# Patient Record
Sex: FEMALE | Race: BLACK OR AFRICAN AMERICAN | Employment: FULL TIME | URBAN - METROPOLITAN AREA
[De-identification: names, ages, dates, MRNs, and addresses within clinical notes are randomized per-mention and may not be internally consistent; named-entity substitution may affect disease eponyms.]

---

## 2017-01-16 ENCOUNTER — OFFICE VISIT (OUTPATIENT)
Dept: OBGYN CLINIC | Facility: CLINIC | Age: 47
End: 2017-01-16

## 2017-01-16 VITALS
WEIGHT: 148 LBS | BODY MASS INDEX: 29.06 KG/M2 | SYSTOLIC BLOOD PRESSURE: 122 MMHG | HEIGHT: 60 IN | DIASTOLIC BLOOD PRESSURE: 74 MMHG

## 2017-01-16 DIAGNOSIS — Z12.31 VISIT FOR SCREENING MAMMOGRAM: Primary | ICD-10-CM

## 2017-01-16 DIAGNOSIS — Z01.419 WELL WOMAN EXAM: ICD-10-CM

## 2017-01-16 PROCEDURE — 99396 PREV VISIT EST AGE 40-64: CPT | Performed by: OBSTETRICS & GYNECOLOGY

## 2017-01-18 NOTE — PROGRESS NOTES
HPI:   Laeh Barrett is a 51year old female who presents for an annual/pap.         Wt Readings from Last 6 Encounters:  01/16/17 : 148 lb (67.132 kg)  02/15/16 : 142 lb (64.411 kg)  01/22/16 : 142 lb 3.2 oz (64.501 kg)  06/01/15 : 148 lb (67.132 kg)  02 Alcohol Use: Yes           0.0 oz/week       0 Standard drinks or equivalent per week       Comment: Socially, 1 drink a year.            REVIEW OF SYSTEMS:   GENERAL: feels well otherwise  SKIN: denies any unusual skin lesions  EYES:denies

## 2017-01-19 LAB — HPV I/H RISK 1 DNA SPEC QL NAA+PROBE: NEGATIVE

## 2017-02-06 NOTE — PROGRESS NOTES
Quick Note:    Normal letter sent via [address] [place] [address] [address].  FRIEDA ESCOBAR.  ______

## 2017-02-07 RX ORDER — LOVASTATIN 20 MG/1
TABLET ORAL
Qty: 90 TABLET | Refills: 0 | Status: SHIPPED | OUTPATIENT
Start: 2017-02-07 | End: 2017-05-17

## 2017-02-12 ENCOUNTER — HOSPITAL ENCOUNTER (OUTPATIENT)
Dept: MAMMOGRAPHY | Facility: HOSPITAL | Age: 47
Discharge: HOME | End: 2017-02-12
Attending: OBSTETRICS & GYNECOLOGY
Payer: COMMERCIAL

## 2017-02-12 DIAGNOSIS — Z01.419 WELL WOMAN EXAM: ICD-10-CM

## 2017-02-12 PROCEDURE — 77067 SCR MAMMO BI INCL CAD: CPT

## 2017-02-17 ENCOUNTER — OFFICE VISIT (OUTPATIENT)
Dept: INTERNAL MEDICINE CLINIC | Facility: CLINIC | Age: 47
End: 2017-02-17

## 2017-02-17 VITALS
DIASTOLIC BLOOD PRESSURE: 72 MMHG | OXYGEN SATURATION: 100 % | HEART RATE: 73 BPM | WEIGHT: 144 LBS | TEMPERATURE: 98 F | HEIGHT: 60 IN | BODY MASS INDEX: 28.27 KG/M2 | SYSTOLIC BLOOD PRESSURE: 110 MMHG

## 2017-02-17 DIAGNOSIS — Z00.00 ROUTINE GENERAL MEDICAL EXAMINATION AT A HEALTH CARE FACILITY: Primary | ICD-10-CM

## 2017-02-17 LAB
ALBUMIN SERPL BCP-MCNC: 3.7 G/DL (ref 3.5–4.8)
ALBUMIN/GLOB SERPL: 1.1 {RATIO} (ref 1–2)
ALP SERPL-CCNC: 51 U/L (ref 32–100)
ALT SERPL-CCNC: 12 U/L (ref 14–54)
ANION GAP SERPL CALC-SCNC: 8 MMOL/L (ref 0–18)
AST SERPL-CCNC: 19 U/L (ref 15–41)
BILIRUB SERPL-MCNC: 0.7 MG/DL (ref 0.3–1.2)
BUN SERPL-MCNC: 9 MG/DL (ref 8–20)
BUN/CREAT SERPL: 9.6 (ref 10–20)
CALCIUM SERPL-MCNC: 9.2 MG/DL (ref 8.5–10.5)
CHLORIDE SERPL-SCNC: 104 MMOL/L (ref 95–110)
CHOLEST SERPL-MCNC: 151 MG/DL (ref 110–200)
CO2 SERPL-SCNC: 26 MMOL/L (ref 22–32)
CREAT SERPL-MCNC: 0.94 MG/DL (ref 0.5–1.5)
GLOBULIN PLAS-MCNC: 3.3 G/DL (ref 2.5–3.7)
GLUCOSE SERPL-MCNC: 78 MG/DL (ref 70–99)
HDLC SERPL-MCNC: 54 MG/DL
LDLC SERPL CALC-MCNC: 83 MG/DL (ref 0–99)
NONHDLC SERPL-MCNC: 97 MG/DL
OSMOLALITY UR CALC.SUM OF ELEC: 284 MOSM/KG (ref 275–295)
POTASSIUM SERPL-SCNC: 3.6 MMOL/L (ref 3.3–5.1)
PROT SERPL-MCNC: 7 G/DL (ref 5.9–8.4)
SODIUM SERPL-SCNC: 138 MMOL/L (ref 136–144)
TRIGL SERPL-MCNC: 69 MG/DL (ref 1–149)

## 2017-02-17 PROCEDURE — 99396 PREV VISIT EST AGE 40-64: CPT | Performed by: INTERNAL MEDICINE

## 2017-02-17 PROCEDURE — 36415 COLL VENOUS BLD VENIPUNCTURE: CPT | Performed by: INTERNAL MEDICINE

## 2017-02-17 NOTE — PROGRESS NOTES
HPI:   Renetta Sandra is a 51year old female who presents for a complete physical exam. Symptoms: periods are regular. Patient complains of needs annual exam. Has occ clots.   Due for recheck of her cholesterol, taking the lovastatin regularly     Wt Read History:     Smoking Status: Never Smoker                      Alcohol Use: Yes           0.0 oz/week       0 Standard drinks or equivalent per week       Comment: Socially, 1 drink a year.     Exercise: minimal.  Diet: trying to eat healthier     REVIEW OF Normal. Oropharynx - clear no erythema or exudate   Neck Exam Normal Palpation - Normal. No thyromegaly or lymphadenopathy   Breast Normal Done with gyne   Respiratory Normal Auscultation - Normal, no wheezes or rales   Cardiovascular Normal Regular rate a

## 2017-02-17 NOTE — PATIENT INSTRUCTIONS
ASSESSMENT AND PLAN:   Kelsey Clemons is a 51year old female who presents for a complete physical exam.  Self breast exam explained. Health maintenance: Patient is due for check of her cholesterol.    Pt' s weight is Body mass index is 28.12 kg/(m^2) Try to hold each Kegel for a slow count to 5. You probably won’t be able to hold them for that long at first. But keep practicing. It will get easier as your pelvic floor gets stronger.  Eventually, special weights that you place in your vagina may be recom

## 2017-02-27 ENCOUNTER — TELEPHONE (OUTPATIENT)
Dept: OBGYN CLINIC | Facility: CLINIC | Age: 47
End: 2017-02-27

## 2017-02-27 NOTE — TELEPHONE ENCOUNTER
Per pt would like to know how was the visit code it on 01/16/2017 ? Because she needs to be re code it, getting a bill for annual pap. ..  Was told was code incorrectly

## 2017-02-27 NOTE — TELEPHONE ENCOUNTER
YOKASTA- was told to spk w Dr. Debroah Mohs office-okay to [facility] once this is done or what happened?  [phone]

## 2017-03-01 NOTE — TELEPHONE ENCOUNTER
Pap was billed with diagnosis for annual mammo,  And mammo was associated with pap diagnosis, need to call to get corrected, lm informing pt we will look into how to fix this.

## 2017-05-17 RX ORDER — LOVASTATIN 20 MG/1
TABLET ORAL
Qty: 90 TABLET | Refills: 0 | Status: SHIPPED | OUTPATIENT
Start: 2017-05-17 | End: 2017-09-20

## 2017-05-17 NOTE — TELEPHONE ENCOUNTER
Refill protocol criteria met, rx sent.       Cholesterol Medications  Protocol Criteria:  · Appointment scheduled in the past 12 months or in the next 3 months  · ALT & LDL on file in the past 12 months  · ALT result < 80  · LDL result <130   Recent Visits

## 2017-09-20 RX ORDER — LOVASTATIN 20 MG/1
TABLET ORAL
Qty: 90 TABLET | Refills: 0 | Status: SHIPPED | OUTPATIENT
Start: 2017-09-20 | End: 2017-11-28

## 2017-11-28 ENCOUNTER — TELEPHONE (OUTPATIENT)
Dept: INTERNAL MEDICINE CLINIC | Facility: CLINIC | Age: 47
End: 2017-11-28

## 2017-11-28 RX ORDER — LOVASTATIN 20 MG/1
TABLET ORAL
Qty: 90 TABLET | Refills: 0 | Status: SHIPPED | OUTPATIENT
Start: 2017-11-28 | End: 2018-02-25

## 2017-11-28 RX ORDER — FLUTICASONE PROPIONATE 50 MCG
SPRAY, SUSPENSION (ML) NASAL
Qty: 1 BOTTLE | Refills: 3 | Status: SHIPPED | OUTPATIENT
Start: 2017-11-28 | End: 2018-09-07

## 2017-11-28 NOTE — TELEPHONE ENCOUNTER
Current Outpatient Prescriptions:     •  LOVASTATIN 20 MG Oral Tab, TAKE 1 TABLET (20MG) BY MOUTH EVERY DAY WITH THE EVENING MEAL, Disp: 90 tablet, Rfl: 0    •  FLUTICASONE PROPIONATE 50 MCG/ACT Nasal Suspension, USE 1 SPRAY BY INTRANASAL ROUTE 2 TIMES E

## 2017-11-28 NOTE — TELEPHONE ENCOUNTER
Patient has not been seen since 2/2017. However, this seems to be normal that she follows up once a year. Do you want an appt, or can we refill medications?

## 2018-02-20 ENCOUNTER — OFFICE VISIT (OUTPATIENT)
Dept: OBGYN CLINIC | Facility: CLINIC | Age: 48
End: 2018-02-20

## 2018-02-20 VITALS
WEIGHT: 152 LBS | HEIGHT: 60 IN | SYSTOLIC BLOOD PRESSURE: 110 MMHG | DIASTOLIC BLOOD PRESSURE: 68 MMHG | BODY MASS INDEX: 29.84 KG/M2

## 2018-02-20 DIAGNOSIS — Z12.31 VISIT FOR SCREENING MAMMOGRAM: Primary | ICD-10-CM

## 2018-02-20 DIAGNOSIS — Z01.419 ENCOUNTER FOR GYNECOLOGICAL EXAMINATION WITHOUT ABNORMAL FINDING: ICD-10-CM

## 2018-02-20 PROCEDURE — 99396 PREV VISIT EST AGE 40-64: CPT | Performed by: OBSTETRICS & GYNECOLOGY

## 2018-02-20 NOTE — PROGRESS NOTES
HPI:   Karly Liu is a 49year old female who presents for an annual/pap.        Wt Readings from Last 6 Encounters:  02/20/18 : 152 lb (68.9 kg)  02/17/17 : 144 lb (65.3 kg)  01/16/17 : 148 lb (67.1 kg)  02/15/16 : 142 lb (64.4 kg)  01/22/16 : 142 lb Psychiatric Father      alcoholic   • Heart Disease Maternal Grandmother 80     Cause of death   • Psychiatric Cousin      bipolar      Social History:   Smoking status: Never Smoker                                                              Smokeless to exercise 30 minutes three times weekly. The patient indicates understanding of these issues and agrees to the plan. The patient is asked to return for CPX in 1 year.

## 2018-02-21 LAB — HPV I/H RISK 1 DNA SPEC QL NAA+PROBE: NEGATIVE

## 2018-02-22 LAB — LAST PAP RESULT: NORMAL

## 2018-02-25 RX ORDER — LOVASTATIN 20 MG/1
TABLET ORAL
Qty: 90 TABLET | Refills: 1 | Status: SHIPPED | OUTPATIENT
Start: 2018-02-25 | End: 2018-09-07

## 2018-02-25 NOTE — PROGRESS NOTES
HPI:   Karly Liu is a 49year old female who presents for a complete physical exam. Symptoms: periods are regular.  Patient complains of needs physical exam  Had pap with Dr Jessica Martin and normal, given order for mammogram    .     Wt Readings from Last Onset   • Cancer Father      Throat. Cause of death.     • Psychiatric Father      alcoholic   • Heart Disease Maternal Grandmother 80     Cause of death   • Psychiatric Cousin      bipolar      Social History:   Smoking status: Never Smoker Constitutional Normal Well developed.    Eyes Normal Pupil - Right: Normal, Left: Normal.   Ears Normal External - normal. Canal. TM - Normal bilaterally  Hearing - grossly normal   Nasopharynx Normal External nose - Normal. Lips/teeth/gums - Normal. Or

## 2018-02-26 ENCOUNTER — OFFICE VISIT (OUTPATIENT)
Dept: INTERNAL MEDICINE CLINIC | Facility: CLINIC | Age: 48
End: 2018-02-26

## 2018-02-26 VITALS
TEMPERATURE: 99 F | HEIGHT: 60 IN | DIASTOLIC BLOOD PRESSURE: 87 MMHG | SYSTOLIC BLOOD PRESSURE: 137 MMHG | HEART RATE: 58 BPM | BODY MASS INDEX: 30.82 KG/M2 | WEIGHT: 157 LBS

## 2018-02-26 DIAGNOSIS — E78.00 HYPERCHOLESTEROLEMIA: ICD-10-CM

## 2018-02-26 DIAGNOSIS — K62.5 RECTAL BLEEDING: ICD-10-CM

## 2018-02-26 DIAGNOSIS — Z00.00 ROUTINE GENERAL MEDICAL EXAMINATION AT A HEALTH CARE FACILITY: Primary | ICD-10-CM

## 2018-02-26 PROCEDURE — 99396 PREV VISIT EST AGE 40-64: CPT | Performed by: INTERNAL MEDICINE

## 2018-02-27 NOTE — PATIENT INSTRUCTIONS
ASSESSMENT AND PLAN:   Sharon Flynn is a 49year old female who presents for a complete physical exam. Self breast exam explained.    Health maintenance: patient is due for flu shot, cholesterol has mamm order from [facility]  Pt' s weight is Body mass i

## 2018-03-11 ENCOUNTER — LAB ENCOUNTER (OUTPATIENT)
Dept: LAB | Facility: HOSPITAL | Age: 48
End: 2018-03-11
Attending: OBSTETRICS & GYNECOLOGY
Payer: COMMERCIAL

## 2018-03-11 ENCOUNTER — HOSPITAL ENCOUNTER (OUTPATIENT)
Dept: MAMMOGRAPHY | Facility: HOSPITAL | Age: 48
Discharge: HOME | End: 2018-03-11
Attending: OBSTETRICS & GYNECOLOGY
Payer: COMMERCIAL

## 2018-03-11 DIAGNOSIS — E78.00 HYPERCHOLESTEROLEMIA: Primary | ICD-10-CM

## 2018-03-11 DIAGNOSIS — Z12.31 VISIT FOR SCREENING MAMMOGRAM: ICD-10-CM

## 2018-03-11 LAB
ALBUMIN SERPL BCP-MCNC: 3.8 G/DL (ref 3.5–4.8)
ALBUMIN/GLOB SERPL: 1.1 {RATIO} (ref 1–2)
ALP SERPL-CCNC: 53 U/L (ref 32–100)
ALT SERPL-CCNC: 17 U/L (ref 14–54)
ANION GAP SERPL CALC-SCNC: 8 MMOL/L (ref 0–18)
AST SERPL-CCNC: 24 U/L (ref 15–41)
BILIRUB SERPL-MCNC: 0.5 MG/DL (ref 0.3–1.2)
BUN SERPL-MCNC: 7 MG/DL (ref 8–20)
BUN/CREAT SERPL: 7.4 (ref 10–20)
CALCIUM SERPL-MCNC: 9.3 MG/DL (ref 8.5–10.5)
CHLORIDE SERPL-SCNC: 102 MMOL/L (ref 95–110)
CHOLEST SERPL-MCNC: 201 MG/DL (ref 110–200)
CO2 SERPL-SCNC: 29 MMOL/L (ref 22–32)
CREAT SERPL-MCNC: 0.95 MG/DL (ref 0.5–1.5)
GLOBULIN PLAS-MCNC: 3.5 G/DL (ref 2.5–3.7)
GLUCOSE SERPL-MCNC: 92 MG/DL (ref 70–99)
HDLC SERPL-MCNC: 60 MG/DL
LDLC SERPL CALC-MCNC: 113 MG/DL (ref 0–99)
NONHDLC SERPL-MCNC: 141 MG/DL
OSMOLALITY UR CALC.SUM OF ELEC: 286 MOSM/KG (ref 275–295)
PATIENT FASTING: YES
POTASSIUM SERPL-SCNC: 4.5 MMOL/L (ref 3.3–5.1)
PROT SERPL-MCNC: 7.3 G/DL (ref 5.9–8.4)
SODIUM SERPL-SCNC: 139 MMOL/L (ref 136–144)
TRIGL SERPL-MCNC: 140 MG/DL (ref 1–149)

## 2018-03-11 PROCEDURE — 77067 SCR MAMMO BI INCL CAD: CPT | Performed by: OBSTETRICS & GYNECOLOGY

## 2018-03-11 PROCEDURE — 80053 COMPREHEN METABOLIC PANEL: CPT

## 2018-03-11 PROCEDURE — 36415 COLL VENOUS BLD VENIPUNCTURE: CPT

## 2018-03-11 PROCEDURE — 80061 LIPID PANEL: CPT

## 2018-05-16 ENCOUNTER — OFFICE VISIT (OUTPATIENT)
Dept: INTERNAL MEDICINE CLINIC | Facility: CLINIC | Age: 48
End: 2018-05-16

## 2018-05-16 VITALS
BODY MASS INDEX: 29.83 KG/M2 | WEIGHT: 158 LBS | TEMPERATURE: 98 F | RESPIRATION RATE: 18 BRPM | HEART RATE: 78 BPM | HEIGHT: 61 IN | DIASTOLIC BLOOD PRESSURE: 88 MMHG | SYSTOLIC BLOOD PRESSURE: 136 MMHG

## 2018-05-16 DIAGNOSIS — J02.9 SORE THROAT: Primary | ICD-10-CM

## 2018-05-16 DIAGNOSIS — B34.9 VIRAL INFECTION: ICD-10-CM

## 2018-05-16 PROCEDURE — 99212 OFFICE O/P EST SF 10 MIN: CPT | Performed by: INTERNAL MEDICINE

## 2018-05-16 PROCEDURE — 99213 OFFICE O/P EST LOW 20 MIN: CPT | Performed by: INTERNAL MEDICINE

## 2018-05-16 NOTE — PROGRESS NOTES
HPI:    Patient ID: Sharon Flynn is a 51year old female. HPI she came in today for follow-up on her viral infection.   According to her she went to see her dentist and  They told her that most likely she has viral infection , according to her she had Current Outpatient Prescriptions:  Multiple Vitamins-Minerals (ALIVE ONCE DAILY WOMENS 50+ OR)  Disp:  Rfl:    Fluticasone Propionate 50 MCG/ACT Nasal Suspension USE 1 SPRAY BY INTRANASAL ROUTE 2 TIMES EVERY DAY IN EACH NOSTRIL Disp: 1 Bottle Rf tenderness. Left sinus exhibits no maxillary sinus tenderness and no frontal sinus tenderness. Mouth/Throat: Uvula is midline, oropharynx is clear and moist and mucous membranes are normal. Mucous membranes are not cyanotic.  No oropharyngeal exudate, pos if not feeling better call  ? Mouth infection- exam benign - advised her for good oral hygiene  use listerine mouth wash ,, check HSV1/2      Orders Placed This Encounter      POC Rapid Strep [[id number]]      HSV Types 1 + 2 Ab, IgG and IgM with Reflex to Type

## 2018-05-16 NOTE — PATIENT INSTRUCTIONS
Sore throat  (primary encounter diagnosis) her rapid strep is negative , advised to drink plenty of fluids plenty of fluids salt gargles can help as well if not feeling better call  ? Mouth infection- exam benign - advised her for good oral hygiene  use lis

## 2018-05-21 ENCOUNTER — TELEPHONE (OUTPATIENT)
Dept: INTERNAL MEDICINE CLINIC | Facility: CLINIC | Age: 48
End: 2018-05-21

## 2018-05-22 NOTE — TELEPHONE ENCOUNTER
Dr. Joann Salas pt is requesting to speak with you regarding her test results HSV type 1. Please call pt.

## 2018-05-23 NOTE — TELEPHONE ENCOUNTER
Attempted to reach patient, l/m on machine to call back. 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

## 2018-09-07 ENCOUNTER — OFFICE VISIT (OUTPATIENT)
Dept: INTERNAL MEDICINE CLINIC | Facility: CLINIC | Age: 48
End: 2018-09-07

## 2018-09-07 VITALS
WEIGHT: 161 LBS | SYSTOLIC BLOOD PRESSURE: 126 MMHG | DIASTOLIC BLOOD PRESSURE: 72 MMHG | OXYGEN SATURATION: 98 % | BODY MASS INDEX: 30 KG/M2 | TEMPERATURE: 98 F | HEART RATE: 63 BPM

## 2018-09-07 DIAGNOSIS — E78.00 HYPERCHOLESTEROLEMIA: Primary | ICD-10-CM

## 2018-09-07 DIAGNOSIS — J30.1 ALLERGIC RHINITIS DUE TO POLLEN, UNSPECIFIED SEASONALITY: ICD-10-CM

## 2018-09-07 LAB
ALBUMIN SERPL BCP-MCNC: 3.6 G/DL (ref 3.5–4.8)
ALBUMIN/GLOB SERPL: 1.1 {RATIO} (ref 1–2)
ALP SERPL-CCNC: 54 U/L (ref 32–100)
ALT SERPL-CCNC: 12 U/L (ref 14–54)
ANION GAP SERPL CALC-SCNC: 6 MMOL/L (ref 0–18)
AST SERPL-CCNC: 21 U/L (ref 15–41)
BILIRUB SERPL-MCNC: 0.3 MG/DL (ref 0.3–1.2)
BUN SERPL-MCNC: 7 MG/DL (ref 8–20)
BUN/CREAT SERPL: 7 (ref 10–20)
CALCIUM SERPL-MCNC: 8.9 MG/DL (ref 8.5–10.5)
CHLORIDE SERPL-SCNC: 108 MMOL/L (ref 95–110)
CHOLEST SERPL-MCNC: 164 MG/DL (ref 110–200)
CO2 SERPL-SCNC: 26 MMOL/L (ref 22–32)
CREAT SERPL-MCNC: 1 MG/DL (ref 0.5–1.5)
GLOBULIN PLAS-MCNC: 3.3 G/DL (ref 2.5–3.7)
GLUCOSE SERPL-MCNC: 78 MG/DL (ref 70–99)
HDLC SERPL-MCNC: 52 MG/DL
LDLC SERPL CALC-MCNC: 86 MG/DL (ref 0–99)
NONHDLC SERPL-MCNC: 112 MG/DL
OSMOLALITY UR CALC.SUM OF ELEC: 287 MOSM/KG (ref 275–295)
PATIENT FASTING: YES
POTASSIUM SERPL-SCNC: 3.7 MMOL/L (ref 3.3–5.1)
PROT SERPL-MCNC: 6.9 G/DL (ref 5.9–8.4)
SODIUM SERPL-SCNC: 140 MMOL/L (ref 136–144)
TRIGL SERPL-MCNC: 128 MG/DL (ref 1–149)

## 2018-09-07 PROCEDURE — 36415 COLL VENOUS BLD VENIPUNCTURE: CPT | Performed by: INTERNAL MEDICINE

## 2018-09-07 PROCEDURE — 99213 OFFICE O/P EST LOW 20 MIN: CPT | Performed by: INTERNAL MEDICINE

## 2018-09-07 RX ORDER — LOVASTATIN 20 MG/1
TABLET ORAL
Qty: 90 TABLET | Refills: 3 | Status: SHIPPED | OUTPATIENT
Start: 2018-09-07 | End: 2019-10-24

## 2018-09-07 RX ORDER — FLUTICASONE PROPIONATE 50 MCG
SPRAY, SUSPENSION (ML) NASAL
Qty: 1 BOTTLE | Refills: 6 | Status: SHIPPED | OUTPATIENT
Start: 2018-09-07 | End: 2019-09-24

## 2018-09-07 NOTE — PATIENT INSTRUCTIONS
ASSESSMENT/PLAN:   Hypercholesterolemia  Cholesterol well controlled, continue medication, due for recheck     Allergic rhinitis  Continue her zyrtec D and flonase

## 2018-09-07 NOTE — PROGRESS NOTES
HPI:    Patient ID: Kelsey Clemons is a 51year old female. HPI  Hyperlipidemia  Patient here to follow up for their elevated cholesterol. Patient has been following low fat diet. Current medication lovastatin, is compliant with taking.    Having no ac atraumatic. Nose: Right sinus exhibits maxillary sinus tenderness. Left sinus exhibits maxillary sinus tenderness. Mouth/Throat: Oropharynx is clear and moist. No posterior oropharyngeal erythema. Cardiovascular: Normal rate and regular rhythm.      E

## 2019-02-23 ENCOUNTER — OFFICE VISIT (OUTPATIENT)
Dept: OBGYN CLINIC | Facility: CLINIC | Age: 49
End: 2019-02-23

## 2019-02-23 VITALS
HEIGHT: 60 IN | DIASTOLIC BLOOD PRESSURE: 80 MMHG | SYSTOLIC BLOOD PRESSURE: 110 MMHG | BODY MASS INDEX: 30.7 KG/M2 | WEIGHT: 156.38 LBS

## 2019-02-23 DIAGNOSIS — Z01.419 ENCOUNTER FOR WELL WOMAN EXAM WITH ROUTINE GYNECOLOGICAL EXAM: Primary | ICD-10-CM

## 2019-02-23 DIAGNOSIS — Z12.31 ENCOUNTER FOR SCREENING MAMMOGRAM FOR BREAST CANCER: ICD-10-CM

## 2019-02-23 PROCEDURE — 99396 PREV VISIT EST AGE 40-64: CPT | Performed by: OBSTETRICS & GYNECOLOGY

## 2019-02-23 NOTE — PROGRESS NOTES
HPI:   Betzy Tomlinson is a 51year old female who presents for an annual/pap.        Wt Readings from Last 6 Encounters:  02/23/19 : 156 lb 6.4 oz (70.9 kg)  09/07/18 : 161 lb (73 kg)  05/16/18 : 158 lb (71.7 kg)  02/26/18 : 157 lb (71.2 kg)  02/20/18 : 14 Age of Onset   • Cancer Father         Throat. Cause of death.     • Psychiatric Father         alcoholic   • Heart Disease Maternal Grandmother 80        Cause of death   • Psychiatric Cousin         bipolar      Social History:   Social History    Tobacco mass index is 30.54 kg/m². , recommended low fat diet and aerobic exercise 30 minutes three times weekly. The patient indicates understanding of these issues and agrees to the plan. The patient is asked to return for an annual/pap.

## 2019-02-25 LAB — HPV I/H RISK 1 DNA SPEC QL NAA+PROBE: NEGATIVE

## 2019-03-24 ENCOUNTER — HOSPITAL ENCOUNTER (OUTPATIENT)
Dept: MAMMOGRAPHY | Facility: HOSPITAL | Age: 49
Discharge: HOME | End: 2019-03-24
Attending: OBSTETRICS & GYNECOLOGY
Payer: COMMERCIAL

## 2019-03-24 DIAGNOSIS — Z12.31 ENCOUNTER FOR SCREENING MAMMOGRAM FOR BREAST CANCER: ICD-10-CM

## 2019-03-24 PROCEDURE — 77063 BREAST TOMOSYNTHESIS BI: CPT | Performed by: OBSTETRICS & GYNECOLOGY

## 2019-03-24 PROCEDURE — 77067 SCR MAMMO BI INCL CAD: CPT | Performed by: OBSTETRICS & GYNECOLOGY

## 2019-08-29 ENCOUNTER — NURSE TRIAGE (OUTPATIENT)
Dept: OTHER | Age: 49
End: 2019-08-29

## 2019-08-29 NOTE — TELEPHONE ENCOUNTER
Pt requesting appt for next week d/t work schedule, c/c head ache on/off but more on -believes it a migraine- denies n/v,sensitive to light -stated she have a lot of stress-appt made for next Tuesday advised ER if s/s worsens    Reason for Disposition  • H

## 2019-09-02 ENCOUNTER — HOSPITAL ENCOUNTER (EMERGENCY)
Facility: HOSPITAL | Age: 49
Discharge: HOME | End: 2019-09-02
Attending: EMERGENCY MEDICINE
Payer: COMMERCIAL

## 2019-09-02 VITALS
WEIGHT: 150 LBS | BODY MASS INDEX: 29.45 KG/M2 | SYSTOLIC BLOOD PRESSURE: 146 MMHG | HEIGHT: 60 IN | DIASTOLIC BLOOD PRESSURE: 93 MMHG | RESPIRATION RATE: 15 BRPM | TEMPERATURE: 98 F | HEART RATE: 54 BPM | OXYGEN SATURATION: 99 %

## 2019-09-02 DIAGNOSIS — G43.809 OTHER MIGRAINE WITHOUT STATUS MIGRAINOSUS, NOT INTRACTABLE: Primary | ICD-10-CM

## 2019-09-02 PROCEDURE — 96372 THER/PROPH/DIAG INJ SC/IM: CPT

## 2019-09-02 PROCEDURE — 99284 EMERGENCY DEPT VISIT MOD MDM: CPT

## 2019-09-02 RX ORDER — DEXAMETHASONE SODIUM PHOSPHATE 10 MG/ML
10 INJECTION, SOLUTION INTRAMUSCULAR; INTRAVENOUS ONCE
Status: COMPLETED | OUTPATIENT
Start: 2019-09-02 | End: 2019-09-02

## 2019-09-02 RX ORDER — KETOROLAC TROMETHAMINE 30 MG/ML
30 INJECTION, SOLUTION INTRAMUSCULAR; INTRAVENOUS ONCE
Status: COMPLETED | OUTPATIENT
Start: 2019-09-02 | End: 2019-09-02

## 2019-09-02 NOTE — ED NOTES
Presents to ER for c/o L sided headache x10 days, varying in intensity. States pain travels from behind her L eye to behind her L ear and to the back of her neck. Intermittent nausea. No c/o photosensitivity or fever.  Denies vision changes but does report

## 2019-09-02 NOTE — ED PROVIDER NOTES
Patient Seen in: [facility] Emergency Department    History   Patient presents with:  Headache    Stated Complaint: migraines/ no fevers    HPI  Pt c/o a 9/10 headache that began 1 week ago. Headache located on the left side of her head.   This is reviewed from today and agreed except as otherwise stated in HPI.     Physical Exam     ED Triage Vitals [09/02/19 0924]   /89   Pulse 83   Resp 18   Temp 98.2 °F (36.8 °C)   Temp src Oral   SpO2 99 %   O2 Device None (Room air)       Current:/8 MD Paula 70  [phone]    In 1 day        Medications Prescribed:  Current Discharge Medication List

## 2019-09-03 ENCOUNTER — OFFICE VISIT (OUTPATIENT)
Dept: INTERNAL MEDICINE CLINIC | Facility: CLINIC | Age: 49
End: 2019-09-03

## 2019-09-03 ENCOUNTER — LAB ENCOUNTER (OUTPATIENT)
Dept: LAB | Facility: HOSPITAL | Age: 49
End: 2019-09-03
Attending: INTERNAL MEDICINE
Payer: COMMERCIAL

## 2019-09-03 VITALS
BODY MASS INDEX: 30.82 KG/M2 | SYSTOLIC BLOOD PRESSURE: 111 MMHG | WEIGHT: 157 LBS | DIASTOLIC BLOOD PRESSURE: 72 MMHG | HEIGHT: 60 IN | HEART RATE: 66 BPM | OXYGEN SATURATION: 98 %

## 2019-09-03 DIAGNOSIS — E78.00 HYPERCHOLESTEROLEMIA: ICD-10-CM

## 2019-09-03 DIAGNOSIS — Z00.00 ADULT GENERAL MEDICAL EXAM: ICD-10-CM

## 2019-09-03 DIAGNOSIS — G43.119 INTRACTABLE MIGRAINE WITH AURA WITHOUT STATUS MIGRAINOSUS: Primary | ICD-10-CM

## 2019-09-03 DIAGNOSIS — Z12.11 SCREEN FOR COLON CANCER: ICD-10-CM

## 2019-09-03 LAB
ALBUMIN SERPL-MCNC: 4 G/DL (ref 3.4–5)
ALBUMIN/GLOB SERPL: 0.9 {RATIO} (ref 1–2)
ALP LIVER SERPL-CCNC: 70 U/L (ref 39–100)
ALT SERPL-CCNC: 16 U/L (ref 13–56)
ANION GAP SERPL CALC-SCNC: 5 MMOL/L (ref 0–18)
AST SERPL-CCNC: 18 U/L (ref 15–37)
BILIRUB SERPL-MCNC: 0.4 MG/DL (ref 0.1–2)
BUN BLD-MCNC: 12 MG/DL (ref 7–18)
BUN/CREAT SERPL: 8.8 (ref 10–20)
CALCIUM BLD-MCNC: 9.5 MG/DL (ref 8.5–10.1)
CHLORIDE SERPL-SCNC: 107 MMOL/L (ref 98–112)
CHOLEST SMN-MCNC: 179 MG/DL (ref ?–200)
CO2 SERPL-SCNC: 29 MMOL/L (ref 21–32)
CREAT BLD-MCNC: 1.37 MG/DL (ref 0.55–1.02)
DEPRECATED RDW RBC AUTO: 42.1 FL (ref 35.1–46.3)
ERYTHROCYTE [DISTWIDTH] IN BLOOD BY AUTOMATED COUNT: 12.9 % (ref 11–15)
GLOBULIN PLAS-MCNC: 4.4 G/DL (ref 2.8–4.4)
GLUCOSE BLD-MCNC: 90 MG/DL (ref 70–99)
HCT VFR BLD AUTO: 40.9 % (ref 35–48)
HDLC SERPL-MCNC: 79 MG/DL (ref 40–59)
HGB BLD-MCNC: 13.3 G/DL (ref 12–16)
LDLC SERPL CALC-MCNC: 85 MG/DL (ref ?–100)
M PROTEIN MFR SERPL ELPH: 8.4 G/DL (ref 6.4–8.2)
MCH RBC QN AUTO: 29.3 PG (ref 26–34)
MCHC RBC AUTO-ENTMCNC: 32.5 G/DL (ref 31–37)
MCV RBC AUTO: 90.1 FL (ref 80–100)
NONHDLC SERPL-MCNC: 100 MG/DL (ref ?–130)
OSMOLALITY SERPL CALC.SUM OF ELEC: 291 MOSM/KG (ref 275–295)
PATIENT FASTING: YES
PATIENT FASTING: YES
PLATELET # BLD AUTO: 298 10(3)UL (ref 150–450)
POTASSIUM SERPL-SCNC: 3.9 MMOL/L (ref 3.5–5.1)
RBC # BLD AUTO: 4.54 X10(6)UL (ref 3.8–5.3)
SODIUM SERPL-SCNC: 141 MMOL/L (ref 136–145)
T3FREE SERPL-MCNC: 1.86 PG/ML (ref 2.4–4.2)
T4 FREE SERPL-MCNC: 1 NG/DL (ref 0.8–1.7)
TRIGL SERPL-MCNC: 76 MG/DL (ref 30–149)
TSI SER-ACNC: 0.24 MIU/ML (ref 0.36–3.74)
VLDLC SERPL CALC-MCNC: 15 MG/DL (ref 0–30)
WBC # BLD AUTO: 18.5 X10(3) UL (ref 4–11)

## 2019-09-03 PROCEDURE — 99213 OFFICE O/P EST LOW 20 MIN: CPT | Performed by: INTERNAL MEDICINE

## 2019-09-03 PROCEDURE — 84481 FREE ASSAY (FT-3): CPT

## 2019-09-03 PROCEDURE — 80061 LIPID PANEL: CPT

## 2019-09-03 PROCEDURE — 84439 ASSAY OF FREE THYROXINE: CPT

## 2019-09-03 PROCEDURE — 85027 COMPLETE CBC AUTOMATED: CPT

## 2019-09-03 PROCEDURE — 36415 COLL VENOUS BLD VENIPUNCTURE: CPT

## 2019-09-03 PROCEDURE — 80053 COMPREHEN METABOLIC PANEL: CPT

## 2019-09-03 PROCEDURE — 84443 ASSAY THYROID STIM HORMONE: CPT

## 2019-09-03 NOTE — PROGRESS NOTES
Madisyn Madrid is a 47year old female. Patient presents with:  Migraine: frequent  ER F/U    HPI:   26-year-old female with a past medical history of migraine and dyslipidemia here for follow-up.   She reports that her migraine was very bad and she went to appetite change and fever. HENT: Negative for congestion and voice change. Respiratory: Negative for cough and shortness of breath. Cardiovascular: Negative for chest pain.    Gastrointestinal: Negative for vomiting, abdominal pain and abdominal dis COLON SCREEN    Plan: Labs ordered. Colonoscopy referral given. Instructed to call me if there is frequent flareups of migraine. I will see her back 4 to 6 months. The patient indicates understanding of these issues and agrees to the plan.   No foll

## 2019-09-22 ENCOUNTER — APPOINTMENT (OUTPATIENT)
Dept: LAB | Facility: HOSPITAL | Age: 49
End: 2019-09-22
Attending: INTERNAL MEDICINE
Payer: COMMERCIAL

## 2019-09-22 DIAGNOSIS — D72.829 LEUKOCYTOSIS, UNSPECIFIED TYPE: ICD-10-CM

## 2019-09-22 LAB
ANION GAP SERPL CALC-SCNC: 2 MMOL/L (ref 0–18)
BUN BLD-MCNC: 7 MG/DL (ref 7–18)
BUN/CREAT SERPL: 6.3 (ref 10–20)
CALCIUM BLD-MCNC: 9.1 MG/DL (ref 8.5–10.1)
CHLORIDE SERPL-SCNC: 110 MMOL/L (ref 98–112)
CO2 SERPL-SCNC: 30 MMOL/L (ref 21–32)
CREAT BLD-MCNC: 1.12 MG/DL (ref 0.55–1.02)
DEPRECATED RDW RBC AUTO: 41.7 FL (ref 35.1–46.3)
ERYTHROCYTE [DISTWIDTH] IN BLOOD BY AUTOMATED COUNT: 12.8 % (ref 11–15)
GLUCOSE BLD-MCNC: 77 MG/DL (ref 70–99)
HCT VFR BLD AUTO: 38.7 % (ref 35–48)
HGB BLD-MCNC: 12.6 G/DL (ref 12–16)
MCH RBC QN AUTO: 28.9 PG (ref 26–34)
MCHC RBC AUTO-ENTMCNC: 32.6 G/DL (ref 31–37)
MCV RBC AUTO: 88.8 FL (ref 80–100)
OSMOLALITY SERPL CALC.SUM OF ELEC: 291 MOSM/KG (ref 275–295)
PATIENT FASTING: YES
PLATELET # BLD AUTO: 253 10(3)UL (ref 150–450)
POTASSIUM SERPL-SCNC: 3.9 MMOL/L (ref 3.5–5.1)
RBC # BLD AUTO: 4.36 X10(6)UL (ref 3.8–5.3)
SODIUM SERPL-SCNC: 142 MMOL/L (ref 136–145)
TSI SER-ACNC: 1.35 MIU/ML (ref 0.36–3.74)
WBC # BLD AUTO: 7.8 X10(3) UL (ref 4–11)

## 2019-09-22 PROCEDURE — 80048 BASIC METABOLIC PNL TOTAL CA: CPT

## 2019-09-22 PROCEDURE — 84443 ASSAY THYROID STIM HORMONE: CPT

## 2019-09-22 PROCEDURE — 36415 COLL VENOUS BLD VENIPUNCTURE: CPT

## 2019-09-22 PROCEDURE — 85027 COMPLETE CBC AUTOMATED: CPT

## 2019-09-23 NOTE — TELEPHONE ENCOUNTER
Current Outpatient Medications:   • •  Fluticasone Propionate 50 MCG/ACT Nasal Suspension, USE 1 SPRAY BY INTRANASAL ROUTE 2 TIMES EVERY DAY IN EACH NOSTRIL, Disp: 1 Bottle, Rfl: 6  •

## 2019-09-24 RX ORDER — FLUTICASONE PROPIONATE 50 MCG
SPRAY, SUSPENSION (ML) NASAL
Qty: 1 BOTTLE | Refills: 3 | Status: SHIPPED | OUTPATIENT
Start: 2019-09-24 | End: 2020-10-25

## 2019-09-24 NOTE — TELEPHONE ENCOUNTER
Refill passed per [facility], [facility] protocol.     Refill Protocol Appointment Criteria  · Appointment scheduled in the past 12 months or in the next 3 months  Recent Outpatient Visits            3 weeks ago Intractable migraine with aura without status migrai

## 2019-09-27 ENCOUNTER — TELEPHONE (OUTPATIENT)
Dept: INTERNAL MEDICINE CLINIC | Facility: CLINIC | Age: 49
End: 2019-09-27

## 2019-09-27 DIAGNOSIS — Z00.00 GENERAL MEDICAL EXAM: Primary | ICD-10-CM

## 2019-09-30 NOTE — TELEPHONE ENCOUNTER
Patient called informed Dr. Celena Lopez has placed order for lab testing. May go to [facility] no appointment needed, no fasting required and may have labs drawn. Dr. Celena Lopez  Will receive results in a couple days after completed. Patient agreed.

## 2019-09-30 NOTE — TELEPHONE ENCOUNTER
Patient is requesting an HIV test since last test was completed in 2013. Order has been pended.  Dx is needed if you approve test.

## 2019-10-01 ENCOUNTER — LAB ENCOUNTER (OUTPATIENT)
Dept: LAB | Facility: HOSPITAL | Age: 49
End: 2019-10-01
Attending: INTERNAL MEDICINE
Payer: COMMERCIAL

## 2019-10-01 DIAGNOSIS — Z00.00 GENERAL MEDICAL EXAM: ICD-10-CM

## 2019-10-01 PROCEDURE — 87389 HIV-1 AG W/HIV-1&-2 AB AG IA: CPT

## 2019-10-01 PROCEDURE — 36415 COLL VENOUS BLD VENIPUNCTURE: CPT

## 2019-10-03 ENCOUNTER — TELEPHONE (OUTPATIENT)
Dept: INTERNAL MEDICINE CLINIC | Facility: CLINIC | Age: 49
End: 2019-10-03

## 2019-10-03 NOTE — TELEPHONE ENCOUNTER
Pt returning call; Patient informed (Name and  verified) of test result noted below. Patient voiced understanding and agrees.     Notes recorded by Pooja Pond on 10/3/2019 at 10:50 AM CDT  Left message for pt to call back for result.  ------    Notes r

## 2019-10-23 NOTE — TELEPHONE ENCOUNTER
Current Outpatient Medications:   ••  Lovastatin 20 MG Oral Tab, TAKE 1 TABLET (20MG) BY MOUTH EVERY DAY WITH THE EVENING MEAL, Disp: 90 tablet, Rfl: 3  •

## 2019-10-24 RX ORDER — LOVASTATIN 20 MG/1
TABLET ORAL
Qty: 90 TABLET | Refills: 1 | Status: SHIPPED | OUTPATIENT
Start: 2019-10-24 | End: 2020-06-01

## 2019-10-24 NOTE — TELEPHONE ENCOUNTER
Refill passed per [facility] protocol.   Cholesterol Medications  Protocol Criteria:  · Appointment scheduled in the past 12 months or in the next 3 months  · ALT & LDL on file in the past 12 months  · ALT result < 80  · LDL result <130   Recent Outpat

## 2019-11-12 NOTE — H&P
[address] [facility] [address] Gastroenterology                                                                                                  Clinic History and Physical     Pa Date   • INDUCED  17-24 WEEKS     •         Family Hx:   Family History   Problem Relation Age of Onset   • Cancer Father         Throat. Cause of death.     • Psychiatric Father         alcoholic   • Heart Disease Maternal Grandmother 80 anicteric, oropharynx clear, mucus membranes appear moist  CV: regular rate and rhythm, the extremities are warm and well perfused   Lung: effort normal and breath sounds normal, no respiratory distress, wheezes or rales  GI: soft, non-tender exam in all q Stathopoulos with IV [place] or MAC  -Eligible for NE: Yes  -Prep: Split dose Colyte or equivalent  -Anti-platelets and anti-coagulants: None  -Diabetes meds: None    ** If MAC @ [facility]/NE:    - HOLD ACE/ARBs the night before and/or the day of the procedure(

## 2019-11-19 ENCOUNTER — OFFICE VISIT (OUTPATIENT)
Dept: GASTROENTEROLOGY | Facility: CLINIC | Age: 49
End: 2019-11-19

## 2019-11-19 ENCOUNTER — TELEPHONE (OUTPATIENT)
Dept: GASTROENTEROLOGY | Facility: CLINIC | Age: 49
End: 2019-11-19

## 2019-11-19 VITALS
BODY MASS INDEX: 31.02 KG/M2 | SYSTOLIC BLOOD PRESSURE: 142 MMHG | HEART RATE: 64 BPM | HEIGHT: 60 IN | DIASTOLIC BLOOD PRESSURE: 84 MMHG | WEIGHT: 158 LBS

## 2019-11-19 DIAGNOSIS — R11.0 NAUSEA: ICD-10-CM

## 2019-11-19 DIAGNOSIS — Z12.11 SCREENING FOR COLON CANCER: Primary | ICD-10-CM

## 2019-11-19 DIAGNOSIS — Z12.11 COLON CANCER SCREENING: Primary | ICD-10-CM

## 2019-11-19 PROCEDURE — 99243 OFF/OP CNSLTJ NEW/EST LOW 30: CPT | Performed by: NURSE PRACTITIONER

## 2019-11-19 NOTE — PATIENT INSTRUCTIONS
-Schedule colonoscopy w/ Dr. Pipe Fischer with MAC or Dr. Garrison Essex or Dr. Viv Wang with IV [place] or MAC  Dx: screening   -Eligible for NE: Yes  -Prep: Split dose Colyte or equivalent  -Anti-platelets and anti-coagulants: None  -Diabetes meds: None    ** If MA

## 2019-11-19 NOTE — TELEPHONE ENCOUNTER
Scheduled for:  Colonoscopy 58690  Provider Name: Dr Mindy Polk  Date: Jason Oshea 1/07/20  Location:  [facility]  Sedation:  MAC  Time: 4:00 pm, pt aware that [address] will day before with arrival time  Prep: split dose colyte  Meds/Allergies Reconciled?:  NKDA  Diagnosis with

## 2019-12-02 ENCOUNTER — APPOINTMENT (OUTPATIENT)
Dept: LAB | Facility: HOSPITAL | Age: 49
End: 2019-12-02
Attending: NURSE PRACTITIONER
Payer: COMMERCIAL

## 2019-12-02 DIAGNOSIS — R11.0 NAUSEA: ICD-10-CM

## 2019-12-02 PROCEDURE — 87338 HPYLORI STOOL AG IA: CPT

## 2020-01-07 ENCOUNTER — SURGERY CENTER DOCUMENTATION (OUTPATIENT)
Dept: SURGERY | Age: 50
End: 2020-01-07

## 2020-01-07 ENCOUNTER — TELEPHONE (OUTPATIENT)
Dept: SURGERY | Age: 50
End: 2020-01-07

## 2020-01-07 PROCEDURE — 45378 DIAGNOSTIC COLONOSCOPY: CPT | Performed by: INTERNAL MEDICINE

## 2020-01-07 NOTE — PROCEDURES
COLONOSCOPY REPORT    Paty Ibrahim     1970 Age 47year old   PCP Brenton Thomas MD Endoscopist Merilyn Opitz, MD     Date of procedure: 20    Procedure: Colonoscopy w/MAC anesthesia    Pre-operative diagnosis: screening    Post-operative d inflammation. 6. KEN: normal rectal tone, no masses palpated. Recommend:  · Repeat colonoscopy in 10 years. If new signs or symptoms develop, colonoscopy may need to be repeated sooner. · High fiber diet. · Monitor for blood in the stool.  If h

## 2020-01-08 ENCOUNTER — TELEPHONE (OUTPATIENT)
Dept: GASTROENTEROLOGY | Facility: CLINIC | Age: 50
End: 2020-01-08

## 2020-02-19 ENCOUNTER — OFFICE VISIT (OUTPATIENT)
Dept: OBGYN CLINIC | Facility: CLINIC | Age: 50
End: 2020-02-19

## 2020-02-19 VITALS — DIASTOLIC BLOOD PRESSURE: 70 MMHG | BODY MASS INDEX: 32 KG/M2 | WEIGHT: 163.19 LBS | SYSTOLIC BLOOD PRESSURE: 128 MMHG

## 2020-02-19 DIAGNOSIS — Z12.31 ENCOUNTER FOR SCREENING MAMMOGRAM FOR BREAST CANCER: ICD-10-CM

## 2020-02-19 DIAGNOSIS — N95.1 PERIMENOPAUSE: ICD-10-CM

## 2020-02-19 DIAGNOSIS — Z01.419 ENCOUNTER FOR WELL WOMAN EXAM WITH ROUTINE GYNECOLOGICAL EXAM: Primary | ICD-10-CM

## 2020-02-19 PROCEDURE — 99213 OFFICE O/P EST LOW 20 MIN: CPT | Performed by: OBSTETRICS & GYNECOLOGY

## 2020-02-19 PROCEDURE — 99396 PREV VISIT EST AGE 40-64: CPT | Performed by: OBSTETRICS & GYNECOLOGY

## 2020-02-19 RX ORDER — ACETAMINOPHEN AND CODEINE PHOSPHATE 120; 12 MG/5ML; MG/5ML
0.35 SOLUTION ORAL DAILY
Qty: 3 PACKAGE | Refills: 3 | Status: SHIPPED | OUTPATIENT
Start: 2020-02-19 | End: 2020-03-18 | Stop reason: WASHOUT

## 2020-02-19 NOTE — PROGRESS NOTES
HPI:   Reina Chiu is a 47year old female who presents for an annual exam, pt also has hot flushes/mood changes/irregular periods, last one many months ago, c/w perimenopause.       Wt Readings from Last 6 Encounters:  02/19/20 : 163 lb 3.2 oz (74 kg) Suspension USE 1 SPRAY BY INTRANASAL ROUTE 2 TIMES EVERY DAY IN EACH NOSTRIL (Patient not taking: Reported on 2020 ) 1 Bottle 3      Past Medical History:   Diagnosis Date   • Dehydration    • Missed     • Other and unspecified hyperli tenderness  LUNGS: clear to auscultation  CARDIO: RRR without murmur  GI: good BS's,no masses, HSM or tenderness  :introitus is normal,scant discharge,cervix is pink,no adnexal masses or tenderness    MUSCULOSKELETAL: back is not tender,FROM of the back

## 2020-02-26 PROBLEM — N95.1 PERIMENOPAUSE: Status: ACTIVE | Noted: 2020-02-26

## 2020-06-01 RX ORDER — LOVASTATIN 20 MG/1
TABLET ORAL
Qty: 90 TABLET | Refills: 1 | Status: SHIPPED | OUTPATIENT
Start: 2020-06-01 | End: 2020-10-25

## 2020-06-03 ENCOUNTER — HOSPITAL ENCOUNTER (OUTPATIENT)
Dept: MAMMOGRAPHY | Facility: HOSPITAL | Age: 50
Discharge: HOME | End: 2020-06-03
Attending: OBSTETRICS & GYNECOLOGY
Payer: COMMERCIAL

## 2020-06-03 ENCOUNTER — OFFICE VISIT (OUTPATIENT)
Dept: INTERNAL MEDICINE CLINIC | Facility: CLINIC | Age: 50
End: 2020-06-03

## 2020-06-03 VITALS
HEART RATE: 63 BPM | DIASTOLIC BLOOD PRESSURE: 80 MMHG | BODY MASS INDEX: 32.59 KG/M2 | SYSTOLIC BLOOD PRESSURE: 134 MMHG | TEMPERATURE: 100 F | WEIGHT: 166 LBS | HEIGHT: 60 IN

## 2020-06-03 DIAGNOSIS — Z12.31 ENCOUNTER FOR SCREENING MAMMOGRAM FOR BREAST CANCER: ICD-10-CM

## 2020-06-03 DIAGNOSIS — Z00.00 ADULT GENERAL MEDICAL EXAM: Primary | ICD-10-CM

## 2020-06-03 PROCEDURE — 99396 PREV VISIT EST AGE 40-64: CPT | Performed by: INTERNAL MEDICINE

## 2020-06-03 PROCEDURE — 77063 BREAST TOMOSYNTHESIS BI: CPT | Performed by: OBSTETRICS & GYNECOLOGY

## 2020-06-03 PROCEDURE — 77067 SCR MAMMO BI INCL CAD: CPT | Performed by: OBSTETRICS & GYNECOLOGY

## 2020-06-03 NOTE — PROGRESS NOTES
Kenney Cervantes is a 45year old female. Patient presents with:  Physical  Headache: still with headaches    HPI:   27-year-old female with a past medical history of dyslipidemia here for physical.  Overall she reports that she is doing well.   She has been drink a year. Drug use: No     Family History   Problem Relation Age of Onset   • Cancer Father         Throat. Cause of death.     • Psychiatric Father         alcoholic   • Heart Disease Maternal Grandmother 80        Cause of death   • Psychiatric Cou present. Pulmonary/Chest: Effort normal and breath sounds normal. No respiratory distress. She has no wheezes. She has no rales. Abdominal: Soft. Bowel sounds are normal. There is no tenderness.    Neurological: She is alert and oriented to person, place

## 2020-06-06 ENCOUNTER — APPOINTMENT (OUTPATIENT)
Dept: LAB | Facility: HOSPITAL | Age: 50
End: 2020-06-06
Attending: INTERNAL MEDICINE
Payer: COMMERCIAL

## 2020-06-06 DIAGNOSIS — Z00.00 ADULT GENERAL MEDICAL EXAM: ICD-10-CM

## 2020-06-06 PROCEDURE — 84443 ASSAY THYROID STIM HORMONE: CPT

## 2020-06-06 PROCEDURE — 80053 COMPREHEN METABOLIC PANEL: CPT

## 2020-06-06 PROCEDURE — 83036 HEMOGLOBIN GLYCOSYLATED A1C: CPT

## 2020-06-06 PROCEDURE — 80061 LIPID PANEL: CPT

## 2020-06-06 PROCEDURE — 85027 COMPLETE CBC AUTOMATED: CPT

## 2020-06-06 PROCEDURE — 82306 VITAMIN D 25 HYDROXY: CPT

## 2020-06-06 PROCEDURE — 36415 COLL VENOUS BLD VENIPUNCTURE: CPT

## 2020-06-17 ENCOUNTER — OFFICE VISIT (OUTPATIENT)
Dept: OBGYN CLINIC | Facility: CLINIC | Age: 50
End: 2020-06-17

## 2020-06-17 VITALS — SYSTOLIC BLOOD PRESSURE: 140 MMHG | DIASTOLIC BLOOD PRESSURE: 85 MMHG | WEIGHT: 164 LBS | BODY MASS INDEX: 32 KG/M2

## 2020-06-17 DIAGNOSIS — R63.5 WEIGHT GAIN: ICD-10-CM

## 2020-06-17 DIAGNOSIS — N95.1 HOT FLUSHES, PERIMENOPAUSAL: ICD-10-CM

## 2020-06-17 DIAGNOSIS — N92.1 BREAKTHROUGH BLEEDING ON OCPS: ICD-10-CM

## 2020-06-17 DIAGNOSIS — N92.6 IRREGULAR MENSES: Primary | ICD-10-CM

## 2020-06-17 PROCEDURE — 99214 OFFICE O/P EST MOD 30 MIN: CPT | Performed by: OBSTETRICS & GYNECOLOGY

## 2020-06-17 RX ORDER — ACETAMINOPHEN AND CODEINE PHOSPHATE 120; 12 MG/5ML; MG/5ML
0.35 SOLUTION ORAL DAILY
COMMUNITY
Start: 2020-04-26 | End: 2021-01-11

## 2020-06-17 NOTE — PROGRESS NOTES
HPI:   Ann Uribe is a 45year old female who presents for a hx of irregular bleeding. Pt has been on norethindrone 0.35 mg daily and this has fully resolved her hot flushes.   Pt stated for the last month, she has had menstrual spotting, occas small c Propionate 50 MCG/ACT Nasal Suspension USE 1 SPRAY BY INTRANASAL ROUTE 2 TIMES EVERY DAY IN EACH NOSTRIL 1 Bottle 3   • Multiple Vitamins-Minerals (WOMENS MULTIVITAMIN PLUS) Oral Tab Take by mouth. • Biotin 5000 MCG Oral Cap Take 2 capsules by mouth. GENERAL: well developed, well nourished,in no apparent distress  SKIN: no rashes,no suspicious lesions  HEENT: atraumatic, normocephalic  EYES:normal in appearance  NECK: supple,no adenopathy  CHEST: no chest tenderness  LUNGS: clear to auscultation  CAR

## 2020-07-06 ENCOUNTER — HOSPITAL ENCOUNTER (OUTPATIENT)
Dept: ULTRASOUND IMAGING | Age: 50
Discharge: HOME | End: 2020-07-06
Attending: OBSTETRICS & GYNECOLOGY
Payer: COMMERCIAL

## 2020-07-06 DIAGNOSIS — N92.6 IRREGULAR MENSES: ICD-10-CM

## 2020-07-06 PROCEDURE — 76830 TRANSVAGINAL US NON-OB: CPT | Performed by: OBSTETRICS & GYNECOLOGY

## 2020-07-06 PROCEDURE — 76856 US EXAM PELVIC COMPLETE: CPT | Performed by: OBSTETRICS & GYNECOLOGY

## 2020-07-13 ENCOUNTER — TELEPHONE (OUTPATIENT)
Dept: OBGYN CLINIC | Facility: CLINIC | Age: 50
End: 2020-07-13

## 2020-07-13 NOTE — TELEPHONE ENCOUNTER
Pt has had irregular bleeding on norethindrone, almost daily bleeding since April /may 2020, not heavy. Pt is s/p u/s and 3 fibroids noted. Pt counseled on options of further evaluation, pt requesting endosee/endometrial biopsy. Please schedule this in office.

## 2020-07-13 NOTE — TELEPHONE ENCOUNTER
----- Message from Norma De La Rosa MD sent at 7/13/2020 10:35 AM CDT -----  Called pt and left message. Pt with 3 fibroids noted.

## 2020-07-15 NOTE — TELEPHONE ENCOUNTER
Pt scheduled 7/28 at 1 pm.  Pt informed to arrive 15 minutes earlier for this procedure. She understood and verbalized agreement. No further questions.

## 2020-07-28 ENCOUNTER — OFFICE VISIT (OUTPATIENT)
Dept: OBGYN CLINIC | Facility: CLINIC | Age: 50
End: 2020-07-28

## 2020-07-28 VITALS
SYSTOLIC BLOOD PRESSURE: 118 MMHG | BODY MASS INDEX: 32.27 KG/M2 | DIASTOLIC BLOOD PRESSURE: 64 MMHG | HEIGHT: 60 IN | WEIGHT: 164.38 LBS

## 2020-07-28 DIAGNOSIS — N92.4 EXCESSIVE BLEEDING IN PREMENOPAUSAL PERIOD: ICD-10-CM

## 2020-07-28 DIAGNOSIS — N92.6 IRREGULAR BLEEDING: ICD-10-CM

## 2020-07-28 DIAGNOSIS — N92.1 EXCESSIVE AND FREQUENT MENSTRUATION WITH IRREGULAR CYCLE: ICD-10-CM

## 2020-07-28 DIAGNOSIS — Z01.812 PRE-PROCEDURAL LABORATORY EXAMINATION: Primary | ICD-10-CM

## 2020-07-28 LAB
CONTROL LINE PRESENT WITH A CLEAR BACKGROUND (YES/NO): YES YES/NO
KIT LOT #: NORMAL NUMERIC
PREGNANCY TEST, URINE: NEGATIVE

## 2020-07-28 PROCEDURE — 3078F DIAST BP <80 MM HG: CPT | Performed by: OBSTETRICS & GYNECOLOGY

## 2020-07-28 PROCEDURE — 81025 URINE PREGNANCY TEST: CPT | Performed by: OBSTETRICS & GYNECOLOGY

## 2020-07-28 PROCEDURE — 3008F BODY MASS INDEX DOCD: CPT | Performed by: OBSTETRICS & GYNECOLOGY

## 2020-07-28 PROCEDURE — 3074F SYST BP LT 130 MM HG: CPT | Performed by: OBSTETRICS & GYNECOLOGY

## 2020-07-28 PROCEDURE — 58558 HYSTEROSCOPY BIOPSY: CPT | Performed by: OBSTETRICS & GYNECOLOGY

## 2020-07-28 RX ORDER — IBUPROFEN 800 MG/1
800 TABLET ORAL EVERY 8 HOURS PRN
COMMUNITY
Start: 2020-06-23

## 2020-07-28 NOTE — PROGRESS NOTES
HPI:   Catherine Patel is a 45year old female who presents for a hx of irregular menses/clots, last time about 2 weeks ago. Pt stated her last normal period was maybe last summer, and had another menses may 2020.   Pt counseled extneisvely and pt requesti Vitamins-Minerals (WOMENS MULTIVITAMIN PLUS) Oral Tab Take by mouth. • Biotin 5000 MCG Oral Cap Take 2 capsules by mouth. • cetirizine (ZYRTEC) 10 MG Oral Tab Take 10 mg by mouth daily.         Past Medical History:   Diagnosis Date   • Dehydration lesions  HEENT: atraumatic, normocephalic  EYES:normal in appearance  NECK: supple,no adenopathy  CHEST: no chest tenderness  BREAST: no dominant or suspicious mass  LUNGS: clear to auscultation  CARDIO: RRR without murmur  GI: good BS's,no masses, HSM or

## 2020-08-03 ENCOUNTER — TELEPHONE (OUTPATIENT)
Dept: OBGYN CLINIC | Facility: CLINIC | Age: 50
End: 2020-08-03

## 2020-08-03 NOTE — TELEPHONE ENCOUNTER
----- Message from Kayy Flores MD sent at 8/3/2020  1:51 PM CDT -----  Called pt and left message. No malignancy noted.    Per pathology,  The fragment of endometrial stromal tissue benign, no intact glandular element noted, repeat biopsy to be con

## 2020-08-03 NOTE — TELEPHONE ENCOUNTER
Pt informed of results per ASJ message, would like to speak to ASJ for next steps or plan of care since she is bleeding again. Can be called at her work [phone] until 4:45 if not to her cell phone [phone], pls advise pt.

## 2020-08-05 ENCOUNTER — VIRTUAL PHONE E/M (OUTPATIENT)
Dept: INTERNAL MEDICINE CLINIC | Facility: CLINIC | Age: 50
End: 2020-08-05

## 2020-08-05 ENCOUNTER — NURSE TRIAGE (OUTPATIENT)
Dept: INTERNAL MEDICINE CLINIC | Facility: CLINIC | Age: 50
End: 2020-08-05

## 2020-08-05 DIAGNOSIS — R09.81 SINUS CONGESTION: ICD-10-CM

## 2020-08-05 DIAGNOSIS — R51.9 HEADACHE DISORDER: ICD-10-CM

## 2020-08-05 DIAGNOSIS — Z20.822 CLOSE EXPOSURE TO COVID-19 VIRUS: Primary | ICD-10-CM

## 2020-08-05 PROCEDURE — 99213 OFFICE O/P EST LOW 20 MIN: CPT | Performed by: INTERNAL MEDICINE

## 2020-08-05 NOTE — PROGRESS NOTES
Patient ID: Soledad Santos is a 45year old female. Patient presents with:  Sick Call       Virtual/Telephone Check-In    Soledad Santos verbally consents to a Virtual/Telephone Check-In service on 08/05/20.  Patient understands and accepts financial res Allergies    Social History    Socioeconomic History      Marital status: Single      Spouse name: Not on file      Number of children: Not on file      Years of education: Not on file      Highest education level: Not on file    Occupational History Narrative      Not on file      PHYSICAL EXAM:   Unable to perform vitals or do physical exam as this is a telephone visit. Patient is alert and in no apparent distress. There is no conversational dyspnea. She is speaking in full sentences.     ASSESSMEN

## 2020-08-07 ENCOUNTER — LAB ENCOUNTER (OUTPATIENT)
Dept: LAB | Facility: HOSPITAL | Age: 50
End: 2020-08-07
Attending: INTERNAL MEDICINE
Payer: COMMERCIAL

## 2020-08-07 DIAGNOSIS — R51.9 HEADACHE DISORDER: ICD-10-CM

## 2020-08-07 DIAGNOSIS — Z20.822 CLOSE EXPOSURE TO COVID-19 VIRUS: ICD-10-CM

## 2020-08-07 DIAGNOSIS — R09.81 SINUS CONGESTION: ICD-10-CM

## 2020-08-08 LAB — SARS-COV-2 RNA RESP QL NAA+PROBE: NOT DETECTED

## 2020-10-16 ENCOUNTER — OFFICE VISIT (OUTPATIENT)
Dept: OBGYN CLINIC | Facility: CLINIC | Age: 50
End: 2020-10-16

## 2020-10-16 ENCOUNTER — TELEPHONE (OUTPATIENT)
Dept: OBGYN CLINIC | Facility: CLINIC | Age: 50
End: 2020-10-16

## 2020-10-16 VITALS — WEIGHT: 161.19 LBS | BODY MASS INDEX: 31 KG/M2 | SYSTOLIC BLOOD PRESSURE: 120 MMHG | DIASTOLIC BLOOD PRESSURE: 84 MMHG

## 2020-10-16 DIAGNOSIS — D25.9 UTERINE LEIOMYOMA, UNSPECIFIED LOCATION: Primary | ICD-10-CM

## 2020-10-16 DIAGNOSIS — N93.8 DYSFUNCTIONAL UTERINE BLEEDING: ICD-10-CM

## 2020-10-16 DIAGNOSIS — N92.1 METRORRHAGIA: ICD-10-CM

## 2020-10-16 PROCEDURE — 3074F SYST BP LT 130 MM HG: CPT | Performed by: OBSTETRICS & GYNECOLOGY

## 2020-10-16 PROCEDURE — 99214 OFFICE O/P EST MOD 30 MIN: CPT | Performed by: OBSTETRICS & GYNECOLOGY

## 2020-10-16 PROCEDURE — 3079F DIAST BP 80-89 MM HG: CPT | Performed by: OBSTETRICS & GYNECOLOGY

## 2020-10-16 NOTE — TELEPHONE ENCOUNTER
Please schedule the following surgery:    Procedure: davinci total hysterectomy,bilateral salpingectomy, possible bilateral salpingoophorectomy  Assist: (Y/N or none)yes,  Celio kirkpatrickuncion  Date:coordinate with pt,  Requesting the week before thanksgiving  Dx: fibroid uterus/metrorrhagia  Pre-op appt: (Y/N or n/a)yes  Admission type: (IN/OUT/OBVS)inpt   Procedure length time: 2 hours  Recovery time:2-4 weeks. Medical Clearance: (Y/N)  no    Message to nurses:     [address] community: Tubal/ Hyst form MUST be signed (30 days):     COVID19 Lab 5062 needs to be placed for all C-sections

## 2020-10-16 NOTE — PROGRESS NOTES
HPI:   Tonny Gallardo is a 45year old female who presents for a consult for dub,  Pt has irreg bleeding,  Has tried ocps for suppression. Pt now wants a davinci total hysterectomy, bilateral salpingectomy.   Pt counseled on the risks/benefits/alternative MOUTH EVERY DAY WITH THE EVENING MEAL 90 tablet 1   • Fluticasone Propionate 50 MCG/ACT Nasal Suspension USE 1 SPRAY BY INTRANASAL ROUTE 2 TIMES EVERY DAY IN EACH NOSTRIL 1 Bottle 3   • Multiple Vitamins-Minerals (WOMENS MULTIVITAMIN PLUS) Oral Tab Take by allergy or asthma    EXAM:   /84   Wt 161 lb 3.2 oz (73.1 kg)   LMP 05/04/2020 (LMP Unknown)   BMI 31.48 kg/m²   Body mass index is 31.48 kg/m².    GENERAL: well developed, well nourished,in no apparent distress  SKIN: no rashes,no suspicious lesions

## 2020-10-16 NOTE — TELEPHONE ENCOUNTER
Pt scheduled 12/9 at 7:30. Pre-op: 11/24 [facility] TAMRA    Surgery guide sent via my chart. No further questions.   O pt-managed care.    -assist pending

## 2020-10-23 ENCOUNTER — TELEPHONE (OUTPATIENT)
Dept: OBGYN CLINIC | Facility: CLINIC | Age: 50
End: 2020-10-23

## 2020-10-23 PROBLEM — N93.8 DYSFUNCTIONAL UTERINE BLEEDING: Status: ACTIVE | Noted: 2020-10-23

## 2020-10-23 PROBLEM — D25.9 UTERINE LEIOMYOMA: Status: ACTIVE | Noted: 2020-10-23

## 2020-10-23 NOTE — TELEPHONE ENCOUNTER
Patient is returning a call from Dr. Morales [facility] office. She's not sure why she was called.     Please advise

## 2020-10-25 RX ORDER — LOVASTATIN 20 MG/1
TABLET ORAL
Qty: 90 TABLET | Refills: 1 | Status: SHIPPED | OUTPATIENT
Start: 2020-10-25 | End: 2021-01-04

## 2020-10-25 RX ORDER — FLUTICASONE PROPIONATE 50 MCG
SPRAY, SUSPENSION (ML) NASAL
Qty: 1 INHALER | Refills: 2 | Status: SHIPPED | OUTPATIENT
Start: 2020-10-25 | End: 2021-01-04

## 2020-11-24 ENCOUNTER — OFFICE VISIT (OUTPATIENT)
Dept: OBGYN CLINIC | Facility: CLINIC | Age: 50
End: 2020-11-24

## 2020-11-24 ENCOUNTER — OFFICE VISIT (OUTPATIENT)
Dept: INTERNAL MEDICINE CLINIC | Facility: CLINIC | Age: 50
End: 2020-11-24

## 2020-11-24 VITALS
RESPIRATION RATE: 14 BRPM | WEIGHT: 161 LBS | SYSTOLIC BLOOD PRESSURE: 130 MMHG | DIASTOLIC BLOOD PRESSURE: 80 MMHG | HEIGHT: 60 IN | OXYGEN SATURATION: 98 % | BODY MASS INDEX: 31.61 KG/M2 | HEART RATE: 88 BPM

## 2020-11-24 VITALS — BODY MASS INDEX: 31 KG/M2 | WEIGHT: 160 LBS | SYSTOLIC BLOOD PRESSURE: 130 MMHG | DIASTOLIC BLOOD PRESSURE: 85 MMHG

## 2020-11-24 DIAGNOSIS — Z01.818 PRE-OP TESTING: ICD-10-CM

## 2020-11-24 DIAGNOSIS — D25.9 UTERINE LEIOMYOMA, UNSPECIFIED LOCATION: Primary | ICD-10-CM

## 2020-11-24 DIAGNOSIS — N93.8 DYSFUNCTIONAL UTERINE BLEEDING: ICD-10-CM

## 2020-11-24 DIAGNOSIS — Z23 NEED FOR INFLUENZA VACCINATION: ICD-10-CM

## 2020-11-24 DIAGNOSIS — E78.00 HYPERCHOLESTEROLEMIA: Primary | ICD-10-CM

## 2020-11-24 PROCEDURE — 99214 OFFICE O/P EST MOD 30 MIN: CPT | Performed by: OBSTETRICS & GYNECOLOGY

## 2020-11-24 PROCEDURE — 3075F SYST BP GE 130 - 139MM HG: CPT | Performed by: INTERNAL MEDICINE

## 2020-11-24 PROCEDURE — 3075F SYST BP GE 130 - 139MM HG: CPT | Performed by: OBSTETRICS & GYNECOLOGY

## 2020-11-24 PROCEDURE — 3079F DIAST BP 80-89 MM HG: CPT | Performed by: INTERNAL MEDICINE

## 2020-11-24 PROCEDURE — 3079F DIAST BP 80-89 MM HG: CPT | Performed by: OBSTETRICS & GYNECOLOGY

## 2020-11-24 PROCEDURE — 3008F BODY MASS INDEX DOCD: CPT | Performed by: INTERNAL MEDICINE

## 2020-11-24 PROCEDURE — 99213 OFFICE O/P EST LOW 20 MIN: CPT | Performed by: INTERNAL MEDICINE

## 2020-11-24 PROCEDURE — 90686 IIV4 VACC NO PRSV 0.5 ML IM: CPT | Performed by: INTERNAL MEDICINE

## 2020-11-24 PROCEDURE — 90471 IMMUNIZATION ADMIN: CPT | Performed by: INTERNAL MEDICINE

## 2020-11-24 RX ORDER — MULTIVIT-MIN/IRON/FOLIC ACID/K 18-600-40
CAPSULE ORAL
COMMUNITY

## 2020-11-24 NOTE — PROGRESS NOTES
Lakshmi Angeles is a 45year old female. Patient presents with: Follow - Up  Hyperlipidemia  Injection: Declined flu vaccine     HPI:   19-year-old pleasant lady with a past medical history of dyslipidemia here for follow-up.   She reports that she is doing Disease Maternal Grandmother 80        Cause of death   • Psychiatric Cousin         bipolar      No Known Allergies     REVIEW OF SYSTEMS:     Review of Systems   Constitutional: Negative for activity change, appetite change and fever.    HENT: Negative fo

## 2020-11-25 NOTE — PROGRESS NOTES
HPI:   Key Toribio is a 45year old female who presents for a hx of dysfunctional uterine bleeding. Pt has previously tried medical management and now wants definitive surgical management for her DUB.   Reviewed pt's previous endometrial biopsy which w (mg/dL)   Date Value   06/06/2020 152   09/03/2019 179   09/07/2018 164   03/11/2018 201 (H)   02/17/2017 151   03/11/2016 160   02/04/2015 167   07/08/2014 158     HDL Cholesterol (mg/dL)   Date Value   06/06/2020 52   09/03/2019 79 (H)   09/07/2018 52 Procedure Laterality Date   • INDUCED  17-24 WEEKS     •         Family History   Problem Relation Age of Onset   • Cancer Father         Throat. Cause of death.     • Psychiatric Father         alcoholic   • Heart Disease Maternal Grandmo definitive surgical management for her DUB. Reviewed pt's previous endometrial biopsy which was benign.   Reviewed pt's last u/s:  Pt counseled on the risks/benefits/alternatives of davinci total hysterectomy/bilateral salpingectomy, including the risks of

## 2020-12-03 ENCOUNTER — TELEPHONE (OUTPATIENT)
Dept: OBGYN CLINIC | Facility: CLINIC | Age: 50
End: 2020-12-03

## 2020-12-03 NOTE — TELEPHONE ENCOUNTER
Pt dropped off FMLA forms to be filled out. MARIA DE JESUS signed, pt will pay once completed. Pt would to  forms at ob 308.

## 2020-12-04 NOTE — TELEPHONE ENCOUNTER
Dr. Nino Dies,     Please sign off on form:  -Highlight the patient and hit \"Chart\" button.   -In Chart Review, w/in the Encounter tab - click 1 time on the Telephone call encounter for 12/3/20 Scroll down the telephone encounter.  -Click \"scan on\" blue

## 2020-12-04 NOTE — TELEPHONE ENCOUNTER
formsdept received fmla.logged for processing. hipaa section 4 incomplete. Sent [facility] message for new hipaa.

## 2020-12-06 ENCOUNTER — APPOINTMENT (OUTPATIENT)
Dept: LAB | Facility: HOSPITAL | Age: 50
End: 2020-12-06
Attending: OBSTETRICS & GYNECOLOGY
Payer: COMMERCIAL

## 2020-12-06 DIAGNOSIS — Z01.818 PREOPERATIVE TESTING: ICD-10-CM

## 2020-12-06 DIAGNOSIS — Z01.818 PRE-OP TESTING: ICD-10-CM

## 2020-12-06 PROCEDURE — 36415 COLL VENOUS BLD VENIPUNCTURE: CPT

## 2020-12-06 PROCEDURE — 85025 COMPLETE CBC W/AUTO DIFF WBC: CPT

## 2020-12-06 PROCEDURE — 86850 RBC ANTIBODY SCREEN: CPT

## 2020-12-06 PROCEDURE — 86900 BLOOD TYPING SEROLOGIC ABO: CPT

## 2020-12-06 PROCEDURE — 86901 BLOOD TYPING SEROLOGIC RH(D): CPT

## 2020-12-07 NOTE — TELEPHONE ENCOUNTER
Returned patients call and LM to respond to the HIPAA message so her signed form could be faxed out. She did not state why she was calling.  I

## 2020-12-08 ENCOUNTER — PATIENT MESSAGE (OUTPATIENT)
Dept: ADMINISTRATIVE | Age: 50
End: 2020-12-08

## 2020-12-08 ENCOUNTER — ANESTHESIA EVENT (OUTPATIENT)
Dept: SURGERY | Facility: HOSPITAL | Age: 50
End: 2020-12-08
Payer: COMMERCIAL

## 2020-12-09 ENCOUNTER — HOSPITAL ENCOUNTER (OUTPATIENT)
Facility: HOSPITAL | Age: 50
Discharge: HOME | End: 2020-12-10
Attending: OBSTETRICS & GYNECOLOGY | Admitting: OBSTETRICS & GYNECOLOGY
Payer: COMMERCIAL

## 2020-12-09 ENCOUNTER — ANESTHESIA (OUTPATIENT)
Dept: SURGERY | Facility: HOSPITAL | Age: 50
End: 2020-12-09
Payer: COMMERCIAL

## 2020-12-09 DIAGNOSIS — N92.1 METRORRHAGIA: ICD-10-CM

## 2020-12-09 DIAGNOSIS — D25.9 UTERINE LEIOMYOMA, UNSPECIFIED LOCATION: ICD-10-CM

## 2020-12-09 DIAGNOSIS — Z01.818 PREOPERATIVE TESTING: Primary | ICD-10-CM

## 2020-12-09 PROCEDURE — 0UT94ZZ RESECTION OF UTERUS, PERCUTANEOUS ENDOSCOPIC APPROACH: ICD-10-PCS | Performed by: OBSTETRICS & GYNECOLOGY

## 2020-12-09 PROCEDURE — 58571 TLH W/T/O 250 G OR LESS: CPT | Performed by: OBSTETRICS & GYNECOLOGY

## 2020-12-09 PROCEDURE — 8E0W4CZ ROBOTIC ASSISTED PROCEDURE OF TRUNK REGION, PERCUTANEOUS ENDOSCOPIC APPROACH: ICD-10-PCS | Performed by: OBSTETRICS & GYNECOLOGY

## 2020-12-09 PROCEDURE — 0UB74ZZ EXCISION OF BILATERAL FALLOPIAN TUBES, PERCUTANEOUS ENDOSCOPIC APPROACH: ICD-10-PCS | Performed by: OBSTETRICS & GYNECOLOGY

## 2020-12-09 RX ORDER — HYDROMORPHONE HYDROCHLORIDE 1 MG/ML
0.8 INJECTION, SOLUTION INTRAMUSCULAR; INTRAVENOUS; SUBCUTANEOUS EVERY 2 HOUR PRN
Status: DISCONTINUED | OUTPATIENT
Start: 2020-12-09 | End: 2020-12-10

## 2020-12-09 RX ORDER — HYDROMORPHONE HYDROCHLORIDE 1 MG/ML
0.4 INJECTION, SOLUTION INTRAMUSCULAR; INTRAVENOUS; SUBCUTANEOUS EVERY 5 MIN PRN
Status: DISCONTINUED | OUTPATIENT
Start: 2020-12-09 | End: 2020-12-09 | Stop reason: HOSPADM

## 2020-12-09 RX ORDER — SODIUM PHOSPHATE, DIBASIC AND SODIUM PHOSPHATE, MONOBASIC 7; 19 G/133ML; G/133ML
1 ENEMA RECTAL ONCE AS NEEDED
Status: DISCONTINUED | OUTPATIENT
Start: 2020-12-09 | End: 2020-12-10

## 2020-12-09 RX ORDER — SODIUM CHLORIDE, SODIUM LACTATE, POTASSIUM CHLORIDE, CALCIUM CHLORIDE 600; 310; 30; 20 MG/100ML; MG/100ML; MG/100ML; MG/100ML
INJECTION, SOLUTION INTRAVENOUS CONTINUOUS
Status: DISCONTINUED | OUTPATIENT
Start: 2020-12-09 | End: 2020-12-09

## 2020-12-09 RX ORDER — POLYETHYLENE GLYCOL 3350 17 G/17G
17 POWDER, FOR SOLUTION ORAL DAILY PRN
Status: DISCONTINUED | OUTPATIENT
Start: 2020-12-09 | End: 2020-12-10

## 2020-12-09 RX ORDER — ACETAMINOPHEN 500 MG
500 TABLET ORAL EVERY 6 HOURS PRN
COMMUNITY
End: 2022-01-05

## 2020-12-09 RX ORDER — DOCUSATE SODIUM 100 MG/1
100 CAPSULE, LIQUID FILLED ORAL 2 TIMES DAILY
Status: DISCONTINUED | OUTPATIENT
Start: 2020-12-09 | End: 2020-12-10

## 2020-12-09 RX ORDER — ROCURONIUM BROMIDE 10 MG/ML
INJECTION, SOLUTION INTRAVENOUS AS NEEDED
Status: DISCONTINUED | OUTPATIENT
Start: 2020-12-09 | End: 2020-12-09 | Stop reason: SURG

## 2020-12-09 RX ORDER — MIDAZOLAM HYDROCHLORIDE 1 MG/ML
INJECTION INTRAMUSCULAR; INTRAVENOUS AS NEEDED
Status: DISCONTINUED | OUTPATIENT
Start: 2020-12-09 | End: 2020-12-09 | Stop reason: SURG

## 2020-12-09 RX ORDER — HYDROMORPHONE HYDROCHLORIDE 1 MG/ML
0.6 INJECTION, SOLUTION INTRAMUSCULAR; INTRAVENOUS; SUBCUTANEOUS EVERY 5 MIN PRN
Status: DISCONTINUED | OUTPATIENT
Start: 2020-12-09 | End: 2020-12-09 | Stop reason: HOSPADM

## 2020-12-09 RX ORDER — DEXTROSE, SODIUM CHLORIDE, SODIUM LACTATE, POTASSIUM CHLORIDE, AND CALCIUM CHLORIDE 5; .6; .31; .03; .02 G/100ML; G/100ML; G/100ML; G/100ML; G/100ML
INJECTION, SOLUTION INTRAVENOUS CONTINUOUS
Status: DISCONTINUED | OUTPATIENT
Start: 2020-12-09 | End: 2020-12-10

## 2020-12-09 RX ORDER — ACETAMINOPHEN 500 MG
1000 TABLET ORAL ONCE
Status: COMPLETED | OUTPATIENT
Start: 2020-12-09 | End: 2020-12-09

## 2020-12-09 RX ORDER — HALOPERIDOL 5 MG/ML
0.25 INJECTION INTRAMUSCULAR ONCE AS NEEDED
Status: DISCONTINUED | OUTPATIENT
Start: 2020-12-09 | End: 2020-12-09 | Stop reason: HOSPADM

## 2020-12-09 RX ORDER — METOCLOPRAMIDE 10 MG/1
10 TABLET ORAL ONCE
Status: COMPLETED | OUTPATIENT
Start: 2020-12-09 | End: 2020-12-09

## 2020-12-09 RX ORDER — DEXAMETHASONE SODIUM PHOSPHATE 4 MG/ML
VIAL (ML) INJECTION AS NEEDED
Status: DISCONTINUED | OUTPATIENT
Start: 2020-12-09 | End: 2020-12-09 | Stop reason: SURG

## 2020-12-09 RX ORDER — SODIUM CHLORIDE, SODIUM LACTATE, POTASSIUM CHLORIDE, CALCIUM CHLORIDE 600; 310; 30; 20 MG/100ML; MG/100ML; MG/100ML; MG/100ML
INJECTION, SOLUTION INTRAVENOUS CONTINUOUS
Status: DISCONTINUED | OUTPATIENT
Start: 2020-12-09 | End: 2020-12-09 | Stop reason: HOSPADM

## 2020-12-09 RX ORDER — LIDOCAINE HYDROCHLORIDE 10 MG/ML
INJECTION, SOLUTION EPIDURAL; INFILTRATION; INTRACAUDAL; PERINEURAL AS NEEDED
Status: DISCONTINUED | OUTPATIENT
Start: 2020-12-09 | End: 2020-12-09 | Stop reason: SURG

## 2020-12-09 RX ORDER — ENOXAPARIN SODIUM 100 MG/ML
40 INJECTION SUBCUTANEOUS DAILY
Status: DISCONTINUED | OUTPATIENT
Start: 2020-12-09 | End: 2020-12-10

## 2020-12-09 RX ORDER — HYDROMORPHONE HYDROCHLORIDE 1 MG/ML
0.2 INJECTION, SOLUTION INTRAMUSCULAR; INTRAVENOUS; SUBCUTANEOUS EVERY 5 MIN PRN
Status: DISCONTINUED | OUTPATIENT
Start: 2020-12-09 | End: 2020-12-09 | Stop reason: HOSPADM

## 2020-12-09 RX ORDER — HYDROCODONE BITARTRATE AND ACETAMINOPHEN 5; 325 MG/1; MG/1
2 TABLET ORAL EVERY 4 HOURS PRN
Status: DISCONTINUED | OUTPATIENT
Start: 2020-12-09 | End: 2020-12-10

## 2020-12-09 RX ORDER — ONDANSETRON 2 MG/ML
INJECTION INTRAMUSCULAR; INTRAVENOUS AS NEEDED
Status: DISCONTINUED | OUTPATIENT
Start: 2020-12-09 | End: 2020-12-09 | Stop reason: SURG

## 2020-12-09 RX ORDER — PROCHLORPERAZINE EDISYLATE 5 MG/ML
5 INJECTION INTRAMUSCULAR; INTRAVENOUS ONCE AS NEEDED
Status: DISCONTINUED | OUTPATIENT
Start: 2020-12-09 | End: 2020-12-09 | Stop reason: HOSPADM

## 2020-12-09 RX ORDER — ACETAMINOPHEN 325 MG/1
650 TABLET ORAL EVERY 4 HOURS PRN
Status: DISCONTINUED | OUTPATIENT
Start: 2020-12-09 | End: 2020-12-10

## 2020-12-09 RX ORDER — NALOXONE HYDROCHLORIDE 0.4 MG/ML
80 INJECTION, SOLUTION INTRAMUSCULAR; INTRAVENOUS; SUBCUTANEOUS AS NEEDED
Status: DISCONTINUED | OUTPATIENT
Start: 2020-12-09 | End: 2020-12-09 | Stop reason: HOSPADM

## 2020-12-09 RX ORDER — MORPHINE SULFATE 4 MG/ML
2 INJECTION, SOLUTION INTRAMUSCULAR; INTRAVENOUS EVERY 10 MIN PRN
Status: DISCONTINUED | OUTPATIENT
Start: 2020-12-09 | End: 2020-12-09 | Stop reason: HOSPADM

## 2020-12-09 RX ORDER — ONDANSETRON 2 MG/ML
4 INJECTION INTRAMUSCULAR; INTRAVENOUS EVERY 8 HOURS PRN
Status: DISCONTINUED | OUTPATIENT
Start: 2020-12-09 | End: 2020-12-10

## 2020-12-09 RX ORDER — MORPHINE SULFATE 10 MG/ML
6 INJECTION, SOLUTION INTRAMUSCULAR; INTRAVENOUS EVERY 10 MIN PRN
Status: DISCONTINUED | OUTPATIENT
Start: 2020-12-09 | End: 2020-12-09 | Stop reason: HOSPADM

## 2020-12-09 RX ORDER — HYDROCODONE BITARTRATE AND ACETAMINOPHEN 5; 325 MG/1; MG/1
2 TABLET ORAL AS NEEDED
Status: DISCONTINUED | OUTPATIENT
Start: 2020-12-09 | End: 2020-12-09 | Stop reason: HOSPADM

## 2020-12-09 RX ORDER — HYDROMORPHONE HYDROCHLORIDE 1 MG/ML
0.4 INJECTION, SOLUTION INTRAMUSCULAR; INTRAVENOUS; SUBCUTANEOUS EVERY 2 HOUR PRN
Status: DISCONTINUED | OUTPATIENT
Start: 2020-12-09 | End: 2020-12-10

## 2020-12-09 RX ORDER — HYDROCODONE BITARTRATE AND ACETAMINOPHEN 5; 325 MG/1; MG/1
1 TABLET ORAL AS NEEDED
Status: DISCONTINUED | OUTPATIENT
Start: 2020-12-09 | End: 2020-12-09 | Stop reason: HOSPADM

## 2020-12-09 RX ORDER — HYDROMORPHONE HYDROCHLORIDE 1 MG/ML
0.2 INJECTION, SOLUTION INTRAMUSCULAR; INTRAVENOUS; SUBCUTANEOUS EVERY 2 HOUR PRN
Status: DISCONTINUED | OUTPATIENT
Start: 2020-12-09 | End: 2020-12-10

## 2020-12-09 RX ORDER — BUPIVACAINE HYDROCHLORIDE 2.5 MG/ML
INJECTION, SOLUTION EPIDURAL; INFILTRATION; INTRACAUDAL AS NEEDED
Status: DISCONTINUED | OUTPATIENT
Start: 2020-12-09 | End: 2020-12-09 | Stop reason: HOSPADM

## 2020-12-09 RX ORDER — PHENYLEPHRINE HCL 10 MG/ML
VIAL (ML) INJECTION AS NEEDED
Status: DISCONTINUED | OUTPATIENT
Start: 2020-12-09 | End: 2020-12-09 | Stop reason: SURG

## 2020-12-09 RX ORDER — CEFAZOLIN SODIUM/WATER 2 G/20 ML
SYRINGE (ML) INTRAVENOUS AS NEEDED
Status: DISCONTINUED | OUTPATIENT
Start: 2020-12-09 | End: 2020-12-09 | Stop reason: SURG

## 2020-12-09 RX ORDER — FAMOTIDINE 20 MG/1
20 TABLET ORAL ONCE
Status: COMPLETED | OUTPATIENT
Start: 2020-12-09 | End: 2020-12-09

## 2020-12-09 RX ORDER — MORPHINE SULFATE 4 MG/ML
4 INJECTION, SOLUTION INTRAMUSCULAR; INTRAVENOUS EVERY 10 MIN PRN
Status: DISCONTINUED | OUTPATIENT
Start: 2020-12-09 | End: 2020-12-09 | Stop reason: HOSPADM

## 2020-12-09 RX ORDER — SODIUM CHLORIDE 0.9 % (FLUSH) 0.9 %
10 SYRINGE (ML) INJECTION AS NEEDED
Status: DISCONTINUED | OUTPATIENT
Start: 2020-12-09 | End: 2020-12-10

## 2020-12-09 RX ORDER — GLYCOPYRROLATE 0.2 MG/ML
INJECTION, SOLUTION INTRAMUSCULAR; INTRAVENOUS AS NEEDED
Status: DISCONTINUED | OUTPATIENT
Start: 2020-12-09 | End: 2020-12-09 | Stop reason: SURG

## 2020-12-09 RX ORDER — SODIUM CHLORIDE 9 MG/ML
INJECTION, SOLUTION INTRAVENOUS CONTINUOUS PRN
Status: DISCONTINUED | OUTPATIENT
Start: 2020-12-09 | End: 2020-12-09 | Stop reason: SURG

## 2020-12-09 RX ORDER — BISACODYL 10 MG
10 SUPPOSITORY, RECTAL RECTAL
Status: DISCONTINUED | OUTPATIENT
Start: 2020-12-09 | End: 2020-12-10

## 2020-12-09 RX ORDER — ONDANSETRON 2 MG/ML
4 INJECTION INTRAMUSCULAR; INTRAVENOUS ONCE AS NEEDED
Status: DISCONTINUED | OUTPATIENT
Start: 2020-12-09 | End: 2020-12-09 | Stop reason: HOSPADM

## 2020-12-09 RX ORDER — KETOROLAC TROMETHAMINE 30 MG/ML
INJECTION, SOLUTION INTRAMUSCULAR; INTRAVENOUS AS NEEDED
Status: DISCONTINUED | OUTPATIENT
Start: 2020-12-09 | End: 2020-12-09 | Stop reason: SURG

## 2020-12-09 RX ORDER — HYDROCODONE BITARTRATE AND ACETAMINOPHEN 5; 325 MG/1; MG/1
1 TABLET ORAL EVERY 4 HOURS PRN
Status: DISCONTINUED | OUTPATIENT
Start: 2020-12-09 | End: 2020-12-10

## 2020-12-09 RX ORDER — KETAMINE HYDROCHLORIDE 50 MG/ML
INJECTION, SOLUTION, CONCENTRATE INTRAMUSCULAR; INTRAVENOUS AS NEEDED
Status: DISCONTINUED | OUTPATIENT
Start: 2020-12-09 | End: 2020-12-09 | Stop reason: SURG

## 2020-12-09 RX ORDER — ONDANSETRON 4 MG/1
4 TABLET, FILM COATED ORAL EVERY 8 HOURS PRN
Status: DISCONTINUED | OUTPATIENT
Start: 2020-12-09 | End: 2020-12-10

## 2020-12-09 RX ADMIN — DEXAMETHASONE SODIUM PHOSPHATE 4 MG: 4 MG/ML VIAL (ML) INJECTION at 08:08:00

## 2020-12-09 RX ADMIN — ONDANSETRON 4 MG: 2 INJECTION INTRAMUSCULAR; INTRAVENOUS at 07:42:00

## 2020-12-09 RX ADMIN — PHENYLEPHRINE HCL 100 MCG: 10 MG/ML VIAL (ML) INJECTION at 08:57:00

## 2020-12-09 RX ADMIN — SODIUM CHLORIDE, SODIUM LACTATE, POTASSIUM CHLORIDE, CALCIUM CHLORIDE: 600; 310; 30; 20 INJECTION, SOLUTION INTRAVENOUS at 08:59:00

## 2020-12-09 RX ADMIN — ROCURONIUM BROMIDE 10 MG: 10 INJECTION, SOLUTION INTRAVENOUS at 08:21:00

## 2020-12-09 RX ADMIN — KETAMINE HYDROCHLORIDE 40 MG: 50 INJECTION, SOLUTION, CONCENTRATE INTRAMUSCULAR; INTRAVENOUS at 07:59:00

## 2020-12-09 RX ADMIN — SODIUM CHLORIDE: 9 INJECTION, SOLUTION INTRAVENOUS at 07:56:00

## 2020-12-09 RX ADMIN — DEXAMETHASONE SODIUM PHOSPHATE 4 MG: 4 MG/ML VIAL (ML) INJECTION at 07:42:00

## 2020-12-09 RX ADMIN — MIDAZOLAM HYDROCHLORIDE 2 MG: 1 INJECTION INTRAMUSCULAR; INTRAVENOUS at 07:40:00

## 2020-12-09 RX ADMIN — SODIUM CHLORIDE, SODIUM LACTATE, POTASSIUM CHLORIDE, CALCIUM CHLORIDE: 600; 310; 30; 20 INJECTION, SOLUTION INTRAVENOUS at 10:04:00

## 2020-12-09 RX ADMIN — SODIUM CHLORIDE: 9 INJECTION, SOLUTION INTRAVENOUS at 10:04:00

## 2020-12-09 RX ADMIN — CEFAZOLIN SODIUM/WATER 2 G: 2 G/20 ML SYRINGE (ML) INTRAVENOUS at 07:58:00

## 2020-12-09 RX ADMIN — LIDOCAINE HYDROCHLORIDE 50 MG: 10 INJECTION, SOLUTION EPIDURAL; INFILTRATION; INTRACAUDAL; PERINEURAL at 07:42:00

## 2020-12-09 RX ADMIN — SODIUM CHLORIDE: 9 INJECTION, SOLUTION INTRAVENOUS at 08:58:00

## 2020-12-09 RX ADMIN — ROCURONIUM BROMIDE 50 MG: 10 INJECTION, SOLUTION INTRAVENOUS at 07:42:00

## 2020-12-09 RX ADMIN — KETOROLAC TROMETHAMINE 30 MG: 30 INJECTION, SOLUTION INTRAMUSCULAR; INTRAVENOUS at 09:46:00

## 2020-12-09 RX ADMIN — GLYCOPYRROLATE 0.2 MG: 0.2 INJECTION, SOLUTION INTRAMUSCULAR; INTRAVENOUS at 07:42:00

## 2020-12-09 NOTE — ANESTHESIA PREPROCEDURE EVALUATION
Anesthesia PreOp Note    HPI:     Denny Guzmán is a 45year old female who presents for preoperative consultation requested by:  Jennifer Burden MD    Date of Surgery: 12/9/2020    Procedure(s):  XI ROBOT-ASSISTED LAPAROSCOPIC HYSTERECTOMY  Indicati 12/7/2020    •  Lovastatin 20 MG Oral Tab, TAKE 1 TABLET BY MOUTH EVERY DAY WITH THE EVENING MEAL, Disp: 90 tablet, Rfl: 1, 12/8/2020 at 2130    •  ibuprofen 800 MG Oral Tab, Take 800 mg by mouth every 8 (eight) hours as needed. , Disp: , Rfl: , 12/2/2020 Transportation needs        Medical: Not on file        Non-medical: Not on file    Tobacco Use      Smoking status: Never Smoker      Smokeless tobacco: Never Used    Substance and Sexual Activity      Alcohol use: Not Currently        Alcohol/week: 0.0 s height is 1.524 m (5') and weight is 73 kg (161 lb). Her temperature is 98.1 °F (36.7 °C). Her blood pressure is 139/84 and her pulse is 66. Her respiration is 16 and oxygen saturation is 100%.     12/07/20  1240 12/09/20  0650   BP:  139/84   Pulse:  66

## 2020-12-09 NOTE — BRIEF OP NOTE
Pre-Operative Diagnosis: Uterine leiomyoma, unspecified location [D25.9]  Metrorrhagia [N92.1]     Post-Operative Diagnosis: Uterine leiomyoma, unspecified location [D25. 9]Metrorrhagia [N92.1]      Procedure Performed:   Procedure(s):   xi davinci total hy

## 2020-12-09 NOTE — ANESTHESIA PROCEDURE NOTES
Airway  Date/Time: 12/9/2020 7:50 AM  Urgency: Elective    Airway not difficult    General Information and Staff    Patient location during procedure: OR  Anesthesiologist: Jcarlos Ryanr, MD  Resident/CRNA: Russ Granger CRNA  Performed: Yosef Brown

## 2020-12-09 NOTE — PLAN OF CARE
Problem: SKIN/TISSUE INTEGRITY - ADULT  Goal: Skin integrity remains intact  Description: INTERVENTIONS  - Assess and document risk factors for pressure ulcer development  - Assess and document skin integrity  - Monitor for areas of redness and/or skin b INTERVENTIONS  - Monitor WBC  - Administer growth factors as ordered  - Implement neutropenic guidelines  Outcome: Progressing     Problem: SAFETY ADULT - FALL  Goal: Free from fall injury  Description: INTERVENTIONS:  - Assess pt frequently for physical n

## 2020-12-09 NOTE — ANESTHESIA PROCEDURE NOTES
Peripheral IV  Date/Time: 12/9/2020 7:55 AM  Inserted by: Rafaela Dougherty CRNA    Placement  Needle size: 18 G  Laterality: left  Location: hand  Local anesthetic: none  Site prep: alcohol  Technique: anatomical landmarks  Attempts: 1

## 2020-12-09 NOTE — ANESTHESIA POSTPROCEDURE EVALUATION
Patient: Rodríguez Bermudez    Procedure Summary     Date: 12/09/20 Room / Location: [facility] OR  / [facility] OR    Anesthesia Start: 5537 Anesthesia Stop: 9746    Procedure: XI ROBOT-ASSISTED LAPAROSCOPIC HYSTERECTOMY (Bilateral Abdomen) Diagnosis:       Ut

## 2020-12-09 NOTE — H&P
Rodríguez Bermudez is a 45year old female who presents for a hx of dysfunctional uterine bleeding. Pt has previously tried medical management and now wants definitive surgical management for her DUB.   Reviewed pt's previous endometrial biopsy which was wilfrid (mg/dL)   Date Value   06/06/2020 152   09/03/2019 179   09/07/2018 164   03/11/2018 201 (H)   02/17/2017 151   03/11/2016 160   02/04/2015 167   07/08/2014 158          HDL Cholesterol (mg/dL)   Date Value   06/06/2020 52   09/03/2019 79 (H)   09/07/2018 • Pregnancy 2008               Past Surgical History:   Procedure Laterality Date   • INDUCED  17-24 WEEKS       •                   Family History   Problem Relation Age of Onset   • Cancer Father           Throat. Cause of death.     • P uterine bleeding. Pt has previously tried medical management and now wants definitive surgical management for her DUB. Reviewed pt's previous endometrial biopsy which was benign.   Reviewed pt's last u/s:  Pt counseled on the risks/benefits/alternatives o Alert and oriented to person, place and time

## 2020-12-10 VITALS
BODY MASS INDEX: 31.61 KG/M2 | OXYGEN SATURATION: 100 % | SYSTOLIC BLOOD PRESSURE: 134 MMHG | TEMPERATURE: 98 F | DIASTOLIC BLOOD PRESSURE: 65 MMHG | WEIGHT: 161 LBS | HEIGHT: 60 IN | HEART RATE: 78 BPM | RESPIRATION RATE: 18 BRPM

## 2020-12-10 RX ORDER — HYDROCODONE BITARTRATE AND ACETAMINOPHEN 5; 325 MG/1; MG/1
1 TABLET ORAL EVERY 6 HOURS PRN
Qty: 15 TABLET | Refills: 0 | Status: SHIPPED | OUTPATIENT
Start: 2020-12-10 | End: 2021-02-19

## 2020-12-10 NOTE — PLAN OF CARE
Problem: SKIN/TISSUE INTEGRITY - ADULT  Goal: Skin integrity remains intact  Description: INTERVENTIONS  - Assess and document risk factors for pressure ulcer development  - Assess and document skin integrity  - Monitor for areas of redness and/or skin b adequate hydration with IV or PO as ordered and tolerated  - Nasogastric tube to low intermittent suction as ordered  - Evaluate effectiveness of ordered antiemetic medications  - Provide nonpharmacologic comfort measures as appropriate  - Advance diet as

## 2020-12-10 NOTE — PLAN OF CARE
Problem: PAIN - ADULT  Goal: Verbalizes/displays adequate comfort level or patient's stated pain goal  Description: INTERVENTIONS:  - Encourage pt to monitor pain and request assistance  - Assess pain using appropriate pain scale  - Administer analgesics appropriate  - Identify discharge learning needs (meds, wound care, etc)  - Arrange for interpreters to assist at discharge as needed  - Consider post-discharge preferences of patient/family/discharge partner  - Complete POLST form as appropriate  - Assess isolation precautions as appropriate  - Initiate Pressure Ulcer prevention bundle as indicated  Outcome: Adequate for Discharge  Goal: Oral mucous membranes remain intact  Description: INTERVENTIONS  - Assess oral mucosa and hygiene practices  - Implement contributing to increased intake, treat as appropriate  - Monitor I&O, WT and lab values  - Obtain nutritional consult as needed  - Evaluate psychosocial factors contributing to over-consumption  Outcome: Adequate for Discharge     Pain controlled with Nor

## 2020-12-10 NOTE — PROGRESS NOTES
Patient sitting up in bed eating crackers, pudding and yogurt. Patient was able to move herself up in bed and tolerated position change good. Patient still complains pain 7-8/10 but talking on her phone. Will reassess pain after eating.

## 2020-12-11 ENCOUNTER — TELEPHONE (OUTPATIENT)
Dept: OBGYN CLINIC | Facility: CLINIC | Age: 50
End: 2020-12-11

## 2020-12-11 NOTE — OPERATIVE REPORT
[facility]    PATIENT'S NAME: KATRIN ARDON   ATTENDING PHYSICIAN: Krystian Mims MD   OPERATING PHYSICIAN: Too Mims MD   PATIENT ACCOUNT#:   [de-identified]    LOCATION:  [address] [place] [address] #:   [id number]       DATE OF B induction of general endotracheal anesthesia, was prepped and draped in usual sterile fashion. Ancef 2 g was infused preoperatively for infection prophylaxis.   Sequential compression devices were begun prior to procedure and were operative throughout the ligation was performed toward the uterine vessels which were ligated. A similar procedure was performed on the left side. As noted above, the ureters were noted to be away from the operative field.   The uterus and cervix were then transected off the vagi

## 2020-12-17 ENCOUNTER — OFFICE VISIT (OUTPATIENT)
Dept: OBGYN CLINIC | Facility: CLINIC | Age: 50
End: 2020-12-17

## 2020-12-17 VITALS — WEIGHT: 158 LBS | SYSTOLIC BLOOD PRESSURE: 138 MMHG | DIASTOLIC BLOOD PRESSURE: 88 MMHG | BODY MASS INDEX: 31 KG/M2

## 2020-12-17 DIAGNOSIS — Z98.890 POSTOPERATIVE STATE: Primary | ICD-10-CM

## 2020-12-17 PROCEDURE — 3079F DIAST BP 80-89 MM HG: CPT | Performed by: OBSTETRICS & GYNECOLOGY

## 2020-12-17 PROCEDURE — 1111F DSCHRG MED/CURRENT MED MERGE: CPT | Performed by: OBSTETRICS & GYNECOLOGY

## 2020-12-17 PROCEDURE — 3075F SYST BP GE 130 - 139MM HG: CPT | Performed by: OBSTETRICS & GYNECOLOGY

## 2020-12-17 NOTE — PROGRESS NOTES
HPI:   Irasema Palomares is a 45year old female who presents for a postop visit s/p hysterectomy/davinci. and c/o constipation. Pt counseled on miralax, then metamucil. S/p normal colonoscopy 1/2020 per pt.         \ Wt Readings from Last 6 Encounters:  12/17 50 MCG/ACT Nasal Suspension USE 1 SPRAY BY INTRANASAL ROUTE 2 TIMES EVERY DAY IN EACH NOSTRIL 1 Inhaler 2   • ibuprofen 800 MG Oral Tab Take 800 mg by mouth every 8 (eight) hours as needed. • Norethindrone 0.35 MG Oral Tab Take 0.35 mg by mouth daily. exertion  GI: denies abdominal pain,denies heartburn  : denies dysuria, vaginal discharge or itching,periods regular   MUSCULOSKELETAL: denies back pain  NEURO: denies headaches  PSYCHE: denies depression or anxiety  HEMATOLOGIC: denies hx of anemia  END

## 2020-12-22 NOTE — TELEPHONE ENCOUNTER
Patient called asking if an addendum can be sent to [employer] adjusting the start date of her leave to Monday December 7 since she could not go to work after being Covid tested on 12/6. Surgery was on 12/9.  [employer] recommended that the reason be

## 2021-01-04 RX ORDER — LOVASTATIN 20 MG/1
TABLET ORAL
Qty: 90 TABLET | Refills: 1 | Status: SHIPPED | OUTPATIENT
Start: 2021-01-04 | End: 2021-06-14

## 2021-01-04 RX ORDER — FLUTICASONE PROPIONATE 50 MCG
SPRAY, SUSPENSION (ML) NASAL
Qty: 1 INHALER | Refills: 0 | Status: SHIPPED | OUTPATIENT
Start: 2021-01-04 | End: 2021-01-11

## 2021-01-05 RX ORDER — ACETAMINOPHEN AND CODEINE PHOSPHATE 120; 12 MG/5ML; MG/5ML
SOLUTION ORAL
Qty: 84 TABLET | Refills: 3 | OUTPATIENT
Start: 2021-01-05

## 2021-01-11 ENCOUNTER — TELEPHONE (OUTPATIENT)
Dept: INTERNAL MEDICINE CLINIC | Facility: CLINIC | Age: 51
End: 2021-01-11

## 2021-01-11 RX ORDER — FLUTICASONE PROPIONATE 50 MCG
SPRAY, SUSPENSION (ML) NASAL
Qty: 3 INHALER | Refills: 0 | Status: SHIPPED | OUTPATIENT
Start: 2021-01-11 | End: 2021-06-14

## 2021-01-11 RX ORDER — ACETAMINOPHEN AND CODEINE PHOSPHATE 120; 12 MG/5ML; MG/5ML
0.35 SOLUTION ORAL DAILY
Qty: 3 PACKAGE | Refills: 0 | Status: SHIPPED | OUTPATIENT
Start: 2021-01-11 | End: 2021-03-30

## 2021-02-19 ENCOUNTER — OFFICE VISIT (OUTPATIENT)
Dept: OBGYN CLINIC | Facility: CLINIC | Age: 51
End: 2021-02-19

## 2021-02-19 VITALS — WEIGHT: 162.81 LBS | BODY MASS INDEX: 32 KG/M2 | DIASTOLIC BLOOD PRESSURE: 68 MMHG | SYSTOLIC BLOOD PRESSURE: 112 MMHG

## 2021-02-19 DIAGNOSIS — Z01.419 ENCOUNTER FOR WELL WOMAN EXAM WITH ROUTINE GYNECOLOGICAL EXAM: Primary | ICD-10-CM

## 2021-02-19 DIAGNOSIS — Z12.31 ENCOUNTER FOR SCREENING MAMMOGRAM FOR BREAST CANCER: ICD-10-CM

## 2021-02-19 PROCEDURE — 3074F SYST BP LT 130 MM HG: CPT | Performed by: OBSTETRICS & GYNECOLOGY

## 2021-02-19 PROCEDURE — 3078F DIAST BP <80 MM HG: CPT | Performed by: OBSTETRICS & GYNECOLOGY

## 2021-02-19 PROCEDURE — 99396 PREV VISIT EST AGE 40-64: CPT | Performed by: OBSTETRICS & GYNECOLOGY

## 2021-02-19 NOTE — PROGRESS NOTES
HPI:   Key Toribio is a 45year old female who presents for an annual exam.  Pt is s/p davinci hysterectomy 2 months ago and feels well.       Wt Readings from Last 6 Encounters:  02/19/21 : 162 lb 12.8 oz (73.8 kg)  12/17/20 : 158 lb (71.7 kg)  12/09/2 (ZYRTEC) 10 MG Oral Tab Take 10 mg by mouth daily.      • Fluticasone Propionate 50 MCG/ACT Nasal Suspension USE 1 SPRAY BY INTRANASAL ROUTE 2 TIMES EVERY DAY IN EACH NOSTRIL (Patient not taking: Reported on 2/19/2021 ) 3 Inhaler 0   • acetaminophen 500 MG back pain  NEURO: denies headaches  PSYCHE: denies depression or anxiety  HEMATOLOGIC: denies hx of anemia  ENDOCRINE: denies thyroid history  ALL/ASTHMA: denies hx of allergy or asthma    EXAM:   /68   Wt 162 lb 12.8 oz (73.8 kg)   LMP 05/04/2020 (L

## 2021-03-30 RX ORDER — ACETAMINOPHEN AND CODEINE PHOSPHATE 120; 12 MG/5ML; MG/5ML
0.35 SOLUTION ORAL DAILY
Qty: 84 TABLET | Refills: 0 | Status: SHIPPED | OUTPATIENT
Start: 2021-03-30 | End: 2021-06-18

## 2021-04-10 ENCOUNTER — IMMUNIZATION (OUTPATIENT)
Dept: LAB | Age: 51
End: 2021-04-10
Attending: HOSPITALIST
Payer: COMMERCIAL

## 2021-04-10 DIAGNOSIS — Z23 NEED FOR VACCINATION: Primary | ICD-10-CM

## 2021-04-10 PROCEDURE — 0001A SARSCOV2 VAC 30MCG/0.3ML IM: CPT

## 2021-04-23 ENCOUNTER — OFFICE VISIT (OUTPATIENT)
Dept: INTERNAL MEDICINE CLINIC | Facility: CLINIC | Age: 51
End: 2021-04-23

## 2021-04-23 VITALS
OXYGEN SATURATION: 95 % | DIASTOLIC BLOOD PRESSURE: 80 MMHG | WEIGHT: 160 LBS | HEIGHT: 60 IN | HEART RATE: 72 BPM | SYSTOLIC BLOOD PRESSURE: 120 MMHG | BODY MASS INDEX: 31.41 KG/M2 | RESPIRATION RATE: 14 BRPM

## 2021-04-23 DIAGNOSIS — R59.1 LYMPHADENOPATHY OF HEAD AND NECK: Primary | ICD-10-CM

## 2021-04-23 PROBLEM — Z01.818 PREOPERATIVE TESTING: Status: RESOLVED | Noted: 2020-12-09 | Resolved: 2021-04-23

## 2021-04-23 PROCEDURE — 99213 OFFICE O/P EST LOW 20 MIN: CPT | Performed by: INTERNAL MEDICINE

## 2021-04-23 PROCEDURE — 3079F DIAST BP 80-89 MM HG: CPT | Performed by: INTERNAL MEDICINE

## 2021-04-23 PROCEDURE — 3008F BODY MASS INDEX DOCD: CPT | Performed by: INTERNAL MEDICINE

## 2021-04-23 PROCEDURE — 3074F SYST BP LT 130 MM HG: CPT | Performed by: INTERNAL MEDICINE

## 2021-04-23 NOTE — PROGRESS NOTES
Tonny Gallardo is a 45year old female. Patient presents with:  Neck Pain: Left side Collar bone lump    HPI:   79-year-old pleasant lady here for evaluation of lump in her neck.   She reports that she received Covid vaccine 3 weeks back and this week, she       Social History:  Social History    Tobacco Use      Smoking status: Never Smoker      Smokeless tobacco: Never Used    Alcohol use: Not Currently      Alcohol/week: 0.0 standard drinks      Comment: Socially, 1 drink a year. Drug use:  No soft.      Tenderness: There is no abdominal tenderness. Musculoskeletal:      Cervical back: Neck supple. Right lower leg: No edema. Left lower leg: No edema. Skin:     General: Skin is warm and dry.    Neurological:      General: No focal de

## 2021-05-01 ENCOUNTER — IMMUNIZATION (OUTPATIENT)
Dept: LAB | Age: 51
End: 2021-05-01
Attending: HOSPITALIST
Payer: COMMERCIAL

## 2021-05-01 DIAGNOSIS — Z23 NEED FOR VACCINATION: Primary | ICD-10-CM

## 2021-05-01 PROCEDURE — 0002A SARSCOV2 VAC 30MCG/0.3ML IM: CPT

## 2021-06-06 ENCOUNTER — HOSPITAL ENCOUNTER (OUTPATIENT)
Dept: MAMMOGRAPHY | Facility: HOSPITAL | Age: 51
Discharge: HOME | End: 2021-06-06
Attending: OBSTETRICS & GYNECOLOGY
Payer: COMMERCIAL

## 2021-06-06 DIAGNOSIS — Z12.31 ENCOUNTER FOR SCREENING MAMMOGRAM FOR BREAST CANCER: ICD-10-CM

## 2021-06-06 PROCEDURE — 77067 SCR MAMMO BI INCL CAD: CPT | Performed by: OBSTETRICS & GYNECOLOGY

## 2021-06-06 PROCEDURE — 77063 BREAST TOMOSYNTHESIS BI: CPT | Performed by: OBSTETRICS & GYNECOLOGY

## 2021-06-14 ENCOUNTER — OFFICE VISIT (OUTPATIENT)
Dept: INTERNAL MEDICINE CLINIC | Facility: CLINIC | Age: 51
End: 2021-06-14

## 2021-06-14 VITALS
BODY MASS INDEX: 31.8 KG/M2 | OXYGEN SATURATION: 98 % | DIASTOLIC BLOOD PRESSURE: 80 MMHG | SYSTOLIC BLOOD PRESSURE: 126 MMHG | WEIGHT: 162 LBS | RESPIRATION RATE: 16 BRPM | HEIGHT: 60 IN | HEART RATE: 80 BPM

## 2021-06-14 DIAGNOSIS — Z00.00 ADULT GENERAL MEDICAL EXAM: Primary | ICD-10-CM

## 2021-06-14 PROCEDURE — 3074F SYST BP LT 130 MM HG: CPT | Performed by: INTERNAL MEDICINE

## 2021-06-14 PROCEDURE — 3008F BODY MASS INDEX DOCD: CPT | Performed by: INTERNAL MEDICINE

## 2021-06-14 PROCEDURE — 99396 PREV VISIT EST AGE 40-64: CPT | Performed by: INTERNAL MEDICINE

## 2021-06-14 PROCEDURE — 3079F DIAST BP 80-89 MM HG: CPT | Performed by: INTERNAL MEDICINE

## 2021-06-14 RX ORDER — FLUTICASONE PROPIONATE 50 MCG
SPRAY, SUSPENSION (ML) NASAL
Qty: 1 EACH | Refills: 0 | Status: SHIPPED | OUTPATIENT
Start: 2021-06-14 | End: 2022-01-27

## 2021-06-14 RX ORDER — RIZATRIPTAN BENZOATE 10 MG/1
10 TABLET ORAL DAILY PRN
Qty: 9 TABLET | Refills: 1 | Status: SHIPPED | OUTPATIENT
Start: 2021-06-14 | End: 2021-07-14

## 2021-06-14 RX ORDER — FEXOFENADINE HCL 180 MG/1
180 TABLET ORAL DAILY
COMMUNITY

## 2021-06-14 RX ORDER — LOVASTATIN 20 MG/1
TABLET ORAL
Qty: 90 TABLET | Refills: 1 | Status: SHIPPED | OUTPATIENT
Start: 2021-06-14 | End: 2022-01-27

## 2021-06-14 NOTE — PROGRESS NOTES
Soledad Santos is a 45year old female. Patient presents with:  Physical    HPI:   50-year-old pleasant lady with a past medical history of dyslipidemia here for physical.  She reported she is doing well. She has no complaints.   She denies any abuse or de biopsy\"   • Pregnancy    • Pregnancy    • Pregnancy    • Preoperative testing 2020   • Visual impairment       Past Surgical History:   Procedure Laterality Date   • COLONOSCOPY     • HYSTERECTOMY  2020   • INDUCED  17-24 WEEKS kg)  12/09/20 : 161 lb (73 kg)    Body mass index is 31.64 kg/m².       EXAM:   /80 (BP Location: Right arm, Patient Position: Sitting, Cuff Size: adult)   Pulse 80   Resp 16   Ht 5' (1.524 m)   Wt 162 lb (73.5 kg)   LMP 05/04/2020 (LMP Unknown)   SpO carbohydrates. I encourage patient exercise for 30 to 40 minutes 3-4 times a week. Screenings: Up-to-date on Pap smear mammogram and colonoscopy  Vaccines: She is up-to-date on Covid vaccine series and Tdap. Labs: Ordered.   Discussed about seatbelt use,

## 2021-06-18 RX ORDER — ACETAMINOPHEN AND CODEINE PHOSPHATE 120; 12 MG/5ML; MG/5ML
0.35 SOLUTION ORAL DAILY
Qty: 84 TABLET | Refills: 0 | Status: SHIPPED | OUTPATIENT
Start: 2021-06-18 | End: 2021-09-07

## 2021-06-20 ENCOUNTER — LAB ENCOUNTER (OUTPATIENT)
Dept: LAB | Facility: HOSPITAL | Age: 51
End: 2021-06-20
Attending: INTERNAL MEDICINE
Payer: COMMERCIAL

## 2021-06-20 DIAGNOSIS — Z00.00 ADULT GENERAL MEDICAL EXAM: ICD-10-CM

## 2021-06-20 PROCEDURE — 36415 COLL VENOUS BLD VENIPUNCTURE: CPT

## 2021-06-20 PROCEDURE — 80061 LIPID PANEL: CPT

## 2021-06-20 PROCEDURE — 85027 COMPLETE CBC AUTOMATED: CPT

## 2021-06-20 PROCEDURE — 80053 COMPREHEN METABOLIC PANEL: CPT

## 2021-06-20 PROCEDURE — 84443 ASSAY THYROID STIM HORMONE: CPT

## 2021-06-20 PROCEDURE — 83036 HEMOGLOBIN GLYCOSYLATED A1C: CPT

## 2021-06-20 PROCEDURE — 82306 VITAMIN D 25 HYDROXY: CPT

## 2021-07-26 ENCOUNTER — PATIENT MESSAGE (OUTPATIENT)
Dept: OBGYN CLINIC | Facility: CLINIC | Age: 51
End: 2021-07-26

## 2021-07-26 RX ORDER — VENLAFAXINE HYDROCHLORIDE 37.5 MG/1
37.5 CAPSULE, EXTENDED RELEASE ORAL DAILY
Qty: 30 CAPSULE | Refills: 1 | Status: SHIPPED | OUTPATIENT
Start: 2021-07-26 | End: 2021-08-28

## 2021-07-26 NOTE — TELEPHONE ENCOUNTER
Counseled pt on options of management for hot flushes,   Pt requesting to try effexor 37.5 mg daily for this,  Risks/benefits/alternatives counseled and all quesitons answered. Pt to schedule f/u 3-4 weeks in office .

## 2021-07-26 NOTE — TELEPHONE ENCOUNTER
Regarding: FW: Other    ----- Message -----  From: Mack Vivar  Sent: 7/23/2021  10:53 AM CDT  To: Maura Stephenson Ob/Gyne Clinical Staff  Subject: Other                                            ----- Message from Ghazala Mon sent at 7/23/2021 10:53 AM CDT

## 2021-08-28 RX ORDER — VENLAFAXINE HYDROCHLORIDE 37.5 MG/1
CAPSULE, EXTENDED RELEASE ORAL
Qty: 30 CAPSULE | Refills: 1 | Status: SHIPPED | OUTPATIENT
Start: 2021-08-28 | End: 2022-01-05

## 2021-09-07 RX ORDER — ACETAMINOPHEN AND CODEINE PHOSPHATE 120; 12 MG/5ML; MG/5ML
0.35 SOLUTION ORAL DAILY
Qty: 84 TABLET | Refills: 0 | Status: SHIPPED | OUTPATIENT
Start: 2021-09-07 | End: 2021-12-02

## 2021-09-08 ENCOUNTER — TELEPHONE (OUTPATIENT)
Dept: OBGYN CLINIC | Facility: CLINIC | Age: 51
End: 2021-09-08

## 2021-09-09 NOTE — TELEPHONE ENCOUNTER
90 days refill request    Current Outpatient Medications   Medication Sig Dispense Refill   • VENLAFAXINE 37.5 MG Oral Capsule SR 24 Hr TAKE 1 CAPSULE BY MOUTH EVERY DAY 30 capsule 1
Message sent.
Please call pt and confirm the dose, if confirmed, then send rx now.
Medical management

## 2021-12-02 ENCOUNTER — TELEPHONE (OUTPATIENT)
Dept: INTERNAL MEDICINE CLINIC | Facility: CLINIC | Age: 51
End: 2021-12-02

## 2021-12-02 RX ORDER — ACETAMINOPHEN AND CODEINE PHOSPHATE 120; 12 MG/5ML; MG/5ML
0.35 SOLUTION ORAL DAILY
Qty: 84 TABLET | Refills: 0 | Status: SHIPPED | OUTPATIENT
Start: 2021-12-02 | End: 2022-01-05

## 2021-12-02 NOTE — TELEPHONE ENCOUNTER
Patient has a scheduled appt on 1/19/22 and is on the wait list.  Are you able to see patient in December 2021 as patient notes insurance will be changing. Please advise.

## 2021-12-28 ENCOUNTER — PATIENT MESSAGE (OUTPATIENT)
Dept: OBGYN CLINIC | Facility: CLINIC | Age: 51
End: 2021-12-28

## 2021-12-29 RX ORDER — FLUCONAZOLE 150 MG/1
150 TABLET ORAL ONCE
Qty: 1 TABLET | Refills: 0 | Status: SHIPPED | OUTPATIENT
Start: 2021-12-29 | End: 2021-12-29

## 2021-12-29 NOTE — TELEPHONE ENCOUNTER
May send in Diflucan 150 mg x 1 dose.   If symptoms do not resolve after treatment then patient needs to come into the office for an exam.

## 2022-01-05 ENCOUNTER — OFFICE VISIT (OUTPATIENT)
Dept: OBGYN CLINIC | Facility: CLINIC | Age: 52
End: 2022-01-05
Payer: COMMERCIAL

## 2022-01-05 VITALS
BODY MASS INDEX: 32 KG/M2 | WEIGHT: 162 LBS | HEART RATE: 61 BPM | SYSTOLIC BLOOD PRESSURE: 130 MMHG | DIASTOLIC BLOOD PRESSURE: 86 MMHG

## 2022-01-05 DIAGNOSIS — Z71.85 VACCINE COUNSELING: ICD-10-CM

## 2022-01-05 DIAGNOSIS — B37.3 VAGINAL CANDIDIASIS: ICD-10-CM

## 2022-01-05 DIAGNOSIS — N89.8 VAGINAL DISCHARGE: Primary | ICD-10-CM

## 2022-01-05 DIAGNOSIS — N89.8 VAGINAL IRRITATION: ICD-10-CM

## 2022-01-05 PROCEDURE — 3075F SYST BP GE 130 - 139MM HG: CPT | Performed by: NURSE PRACTITIONER

## 2022-01-05 PROCEDURE — 3079F DIAST BP 80-89 MM HG: CPT | Performed by: NURSE PRACTITIONER

## 2022-01-05 PROCEDURE — 90686 IIV4 VACC NO PRSV 0.5 ML IM: CPT | Performed by: NURSE PRACTITIONER

## 2022-01-05 PROCEDURE — 99213 OFFICE O/P EST LOW 20 MIN: CPT | Performed by: NURSE PRACTITIONER

## 2022-01-05 PROCEDURE — 90471 IMMUNIZATION ADMIN: CPT | Performed by: NURSE PRACTITIONER

## 2022-01-05 RX ORDER — FLUCONAZOLE 150 MG/1
150 TABLET ORAL ONCE
Qty: 2 TABLET | Refills: 0 | Status: SHIPPED | OUTPATIENT
Start: 2022-01-05 | End: 2022-01-05

## 2022-01-05 RX ORDER — CLOTRIMAZOLE AND BETAMETHASONE DIPROPIONATE 10; .64 MG/G; MG/G
1 CREAM TOPICAL 2 TIMES DAILY
Qty: 1 EACH | Refills: 0 | Status: SHIPPED | OUTPATIENT
Start: 2022-01-05 | End: 2022-01-12

## 2022-01-05 NOTE — PROGRESS NOTES
[facility], [facility]  Obstetrics and Gynecology  Focused Gynecology Problem Visit  Timothy Fna, MSN, APRN, FNP-BC    HPI   Ravin Carey is a 45year old female who presents to the clinic for Gyn Exam (New Patient/ vaginal itching recently tx with diflucan ) Term 12    M NORMAL SPONT   RAMÓN   1 AB 1986               Past Medical History:   Diagnosis Date   • Dehydration    • Depression    • High cholesterol    • History of total hysterectomy    • Migraines    • Missed     • Other and unspe Bike Helmet: Not Asked        Seat Belt: Not Asked        Self-Exams: Not Asked    Social History Narrative      Not on file    Social Determinants of Health  Financial Resource Strain: Not on file  Food Insecurity: Not on file  Transportation Needs: normal.   Vitals and nursing note reviewed.            ASSESSMENT   (N89.8) Vaginal discharge  (primary encounter diagnosis)  Plan: CHLAMYDIA/GONOCOCCUS, BOLA,         CHLAMYDIA/GONOCOCCUS, BOLA    (N89.8) Vaginal irritation  Plan: GENITAL VAGINOSIS SCREEN, G

## 2022-01-06 LAB
C TRACH DNA SPEC QL NAA+PROBE: NEGATIVE
N GONORRHOEA DNA SPEC QL NAA+PROBE: NEGATIVE

## 2022-01-07 LAB
GENITAL VAGINOSIS SCREEN: NEGATIVE
TRICHOMONAS SCREEN: NEGATIVE

## 2022-01-27 ENCOUNTER — OFFICE VISIT (OUTPATIENT)
Dept: INTERNAL MEDICINE CLINIC | Facility: CLINIC | Age: 52
End: 2022-01-27
Payer: COMMERCIAL

## 2022-01-27 VITALS
DIASTOLIC BLOOD PRESSURE: 80 MMHG | SYSTOLIC BLOOD PRESSURE: 130 MMHG | HEIGHT: 60 IN | BODY MASS INDEX: 31.41 KG/M2 | WEIGHT: 160 LBS | HEART RATE: 70 BPM | OXYGEN SATURATION: 96 % | RESPIRATION RATE: 14 BRPM

## 2022-01-27 DIAGNOSIS — Z12.31 BREAST CANCER SCREENING BY MAMMOGRAM: ICD-10-CM

## 2022-01-27 DIAGNOSIS — G43.119 INTRACTABLE MIGRAINE WITH AURA WITHOUT STATUS MIGRAINOSUS: ICD-10-CM

## 2022-01-27 DIAGNOSIS — E78.00 HYPERCHOLESTEROLEMIA: Primary | ICD-10-CM

## 2022-01-27 PROCEDURE — 99213 OFFICE O/P EST LOW 20 MIN: CPT | Performed by: INTERNAL MEDICINE

## 2022-01-27 PROCEDURE — 3008F BODY MASS INDEX DOCD: CPT | Performed by: INTERNAL MEDICINE

## 2022-01-27 PROCEDURE — 3079F DIAST BP 80-89 MM HG: CPT | Performed by: INTERNAL MEDICINE

## 2022-01-27 PROCEDURE — 3075F SYST BP GE 130 - 139MM HG: CPT | Performed by: INTERNAL MEDICINE

## 2022-01-27 RX ORDER — LOVASTATIN 20 MG/1
TABLET ORAL
Qty: 90 TABLET | Refills: 1 | Status: SHIPPED | OUTPATIENT
Start: 2022-01-27

## 2022-01-27 RX ORDER — FLUTICASONE PROPIONATE 50 MCG
SPRAY, SUSPENSION (ML) NASAL
Qty: 1 EACH | Refills: 0 | Status: SHIPPED | OUTPATIENT
Start: 2022-01-27

## 2022-01-27 NOTE — PROGRESS NOTES
Kaylie Serna is a 45year old female. Patient presents with: Follow - Up    HPI:   50-year-old pleasant lady here for follow-up. She reports that she has been taking her cholesterol medicine regularly.   She still takes her nasal spray and allergy medic History:  Social History    Tobacco Use      Smoking status: Never Smoker      Smokeless tobacco: Never Used    Alcohol use: Not Currently      Alcohol/week: 0.0 standard drinks      Comment: Socially, 1 drink a year.     Drug use: No     Family History   P Tenderness: There is no abdominal tenderness. Musculoskeletal:      Cervical back: Neck supple. Right lower leg: No edema. Left lower leg: No edema. Skin:     General: Skin is warm and dry.    Neurological:      General: No focal deficit prese

## 2022-02-20 RX ORDER — FLUTICASONE PROPIONATE 50 MCG
SPRAY, SUSPENSION (ML) NASAL
Qty: 16 ML | Refills: 1 | Status: SHIPPED | OUTPATIENT
Start: 2022-02-20 | End: 2022-03-23

## 2022-02-20 NOTE — TELEPHONE ENCOUNTER
Refill passed per [facility] protocol.     Requested Prescriptions   Pending Prescriptions Disp Refills    FLUTICASONE PROPIONATE 50 MCG/ACT Nasal Suspension [Pharmacy Med Name: FLUTICASONE PROP 50 MCG SPRAY] 16 mL 0     Sig: USE 1 SPRAY IN EACH NOSTRIL 2 TIMES EVERY DAY        Allergy Medication Protocol Passed - 2/20/2022 12:31 PM        Passed - Appoinment in past 12 or next 3 months              Recent Outpatient Visits              3 weeks ago Hypercholesterolemia    Zulema Bob MD    Office Visit    1 month ago Vaginal discharge    [facility], [address] [place] [address], [facility]    Office Visit    2 months ago Encounter for vaccination    Jordon Reyes, [address]    Nurse Only    8 months ago Adult general medical exam    [facility], [address], MD Hugh    Office Visit    10 months ago Lymphadenopathy of head and neck    Hugh Ackerman MD    Office Visit            Future Appointments         Provider Department Appt Notes    In 2 days Pat Crowley MD [facility], [facility], [address] [place] [address]  annual    In 5 months Cabrera Kulkarni MD [facility], [address], [place] px

## 2022-02-22 ENCOUNTER — OFFICE VISIT (OUTPATIENT)
Dept: OBGYN CLINIC | Facility: CLINIC | Age: 52
End: 2022-02-22
Payer: COMMERCIAL

## 2022-02-22 VITALS
DIASTOLIC BLOOD PRESSURE: 90 MMHG | HEART RATE: 56 BPM | BODY MASS INDEX: 31 KG/M2 | WEIGHT: 161.19 LBS | SYSTOLIC BLOOD PRESSURE: 150 MMHG

## 2022-02-22 DIAGNOSIS — Z01.419 WOMEN'S ANNUAL ROUTINE GYNECOLOGICAL EXAMINATION: Primary | ICD-10-CM

## 2022-02-22 PROCEDURE — 3080F DIAST BP >= 90 MM HG: CPT | Performed by: OBSTETRICS & GYNECOLOGY

## 2022-02-22 PROCEDURE — 3077F SYST BP >= 140 MM HG: CPT | Performed by: OBSTETRICS & GYNECOLOGY

## 2022-02-22 PROCEDURE — 99396 PREV VISIT EST AGE 40-64: CPT | Performed by: OBSTETRICS & GYNECOLOGY

## 2022-02-25 RX ORDER — ACETAMINOPHEN AND CODEINE PHOSPHATE 120; 12 MG/5ML; MG/5ML
0.35 SOLUTION ORAL DAILY
Qty: 84 TABLET | Refills: 0 | OUTPATIENT
Start: 2022-02-25

## 2022-03-23 RX ORDER — FLUTICASONE PROPIONATE 50 MCG
SPRAY, SUSPENSION (ML) NASAL
Qty: 16 ML | Refills: 1 | Status: SHIPPED | OUTPATIENT
Start: 2022-03-23 | End: 2023-07-06

## 2022-03-23 NOTE — TELEPHONE ENCOUNTER
Refill passed per [facility] protocol.      Requested Prescriptions   Pending Prescriptions Disp Refills    FLUTICASONE PROPIONATE 50 MCG/ACT Nasal Suspension [Pharmacy Med Name: FLUTICASONE PROP 50 MCG SPRAY] 16 mL 1     Sig: USE 1 SPRAY IN EACH NOSTRIL 2 TIMES EVERY DAY        Allergy Medication Protocol Passed - 3/23/2022  5:59 PM        Passed - Appoinment in past 12 or next 3 months               Future Appointments         Provider Department Appt Notes    In 4 months Stephany Tello MD [facility], [address], [employer] px    In [place] months Kate Crowley, [address], [address] [place] [address], [place]  annual             Recent Outpatient Visits              4 weeks ago [employer] annual routine gynecological examination    [facility], [facility], [address] [place] [address] Logan Oviedo MD    Office Visit    1 month ago Hypercholesterolemia    [address] [facility] Hugh Connor MD    Office Visit    2 months ago Vaginal discharge    [facility], [address] [facility], [facility] [place], APRN    Office Visit    3 months ago Encounter for vaccination    Jordon Reyes, [address] [place] [address]    Nurse Only    9 months ago Adult general medical exam    [address] [facility] Nikolas, MD Hugh    Office Visit

## 2022-04-23 ENCOUNTER — OFFICE VISIT (OUTPATIENT)
Dept: INTERNAL MEDICINE CLINIC | Facility: CLINIC | Age: 52
End: 2022-04-23
Payer: COMMERCIAL

## 2022-04-23 VITALS
WEIGHT: 163 LBS | DIASTOLIC BLOOD PRESSURE: 89 MMHG | SYSTOLIC BLOOD PRESSURE: 130 MMHG | HEIGHT: 60 IN | HEART RATE: 80 BPM | BODY MASS INDEX: 32 KG/M2

## 2022-04-23 DIAGNOSIS — R51.9 HEADACHE DISORDER: ICD-10-CM

## 2022-04-23 DIAGNOSIS — I10 PRIMARY HYPERTENSION: Primary | ICD-10-CM

## 2022-04-23 DIAGNOSIS — J30.1 ALLERGIC RHINITIS DUE TO POLLEN, UNSPECIFIED SEASONALITY: ICD-10-CM

## 2022-04-23 PROCEDURE — 3075F SYST BP GE 130 - 139MM HG: CPT | Performed by: PHYSICIAN ASSISTANT

## 2022-04-23 PROCEDURE — 99214 OFFICE O/P EST MOD 30 MIN: CPT | Performed by: PHYSICIAN ASSISTANT

## 2022-04-23 PROCEDURE — 3008F BODY MASS INDEX DOCD: CPT | Performed by: PHYSICIAN ASSISTANT

## 2022-04-23 PROCEDURE — 3079F DIAST BP 80-89 MM HG: CPT | Performed by: PHYSICIAN ASSISTANT

## 2022-04-23 RX ORDER — HYDROCHLOROTHIAZIDE 12.5 MG/1
12.5 TABLET ORAL DAILY
Qty: 90 TABLET | Refills: 0 | Status: SHIPPED | OUTPATIENT
Start: 2022-04-23

## 2022-05-13 LAB — AMB EXT COVID-19 RESULT: DETECTED

## 2022-05-18 ENCOUNTER — APPOINTMENT (OUTPATIENT)
Dept: GENERAL RADIOLOGY | Facility: HOSPITAL | Age: 52
End: 2022-05-18
Attending: EMERGENCY MEDICINE
Payer: COMMERCIAL

## 2022-05-18 ENCOUNTER — HOSPITAL ENCOUNTER (EMERGENCY)
Facility: HOSPITAL | Age: 52
Discharge: HOME | End: 2022-05-18
Attending: EMERGENCY MEDICINE
Payer: COMMERCIAL

## 2022-05-18 VITALS
HEART RATE: 93 BPM | DIASTOLIC BLOOD PRESSURE: 73 MMHG | RESPIRATION RATE: 22 BRPM | HEIGHT: 60 IN | WEIGHT: 160 LBS | BODY MASS INDEX: 31.41 KG/M2 | OXYGEN SATURATION: 99 % | TEMPERATURE: 98 F | SYSTOLIC BLOOD PRESSURE: 122 MMHG

## 2022-05-18 DIAGNOSIS — U07.1 COVID: Primary | ICD-10-CM

## 2022-05-18 LAB
ANION GAP SERPL CALC-SCNC: 4 MMOL/L (ref 0–18)
BASOPHILS # BLD AUTO: 0.05 X10(3) UL (ref 0–0.2)
BASOPHILS NFR BLD AUTO: 0.3 %
BUN BLD-MCNC: 9 MG/DL (ref 7–18)
BUN/CREAT SERPL: 8 (ref 10–20)
CALCIUM BLD-MCNC: 9.6 MG/DL (ref 8.5–10.1)
CHLORIDE SERPL-SCNC: 106 MMOL/L (ref 98–112)
CO2 SERPL-SCNC: 31 MMOL/L (ref 21–32)
CREAT BLD-MCNC: 1.12 MG/DL
D DIMER PPP FEU-MCNC: 0.28 UG/ML FEU (ref ?–0.53)
DEPRECATED RDW RBC AUTO: 40.2 FL (ref 35.1–46.3)
EOSINOPHIL # BLD AUTO: 0.04 X10(3) UL (ref 0–0.7)
EOSINOPHIL NFR BLD AUTO: 0.3 %
ERYTHROCYTE [DISTWIDTH] IN BLOOD BY AUTOMATED COUNT: 12.5 % (ref 11–15)
GLUCOSE BLD-MCNC: 105 MG/DL (ref 70–99)
HCT VFR BLD AUTO: 41.8 %
HGB BLD-MCNC: 13.1 G/DL
IMM GRANULOCYTES # BLD AUTO: 0.06 X10(3) UL (ref 0–1)
IMM GRANULOCYTES NFR BLD: 0.4 %
LYMPHOCYTES # BLD AUTO: 1.42 X10(3) UL (ref 1–4)
LYMPHOCYTES NFR BLD AUTO: 9 %
MCH RBC QN AUTO: 28.1 PG (ref 26–34)
MCHC RBC AUTO-ENTMCNC: 31.3 G/DL (ref 31–37)
MCV RBC AUTO: 89.7 FL
MONOCYTES # BLD AUTO: 1.06 X10(3) UL (ref 0.1–1)
MONOCYTES NFR BLD AUTO: 6.7 %
NEUTROPHILS # BLD AUTO: 13.22 X10 (3) UL (ref 1.5–7.7)
NEUTROPHILS # BLD AUTO: 13.22 X10(3) UL (ref 1.5–7.7)
NEUTROPHILS NFR BLD AUTO: 83.3 %
OSMOLALITY SERPL CALC.SUM OF ELEC: 291 MOSM/KG (ref 275–295)
PLATELET # BLD AUTO: 312 10(3)UL (ref 150–450)
POTASSIUM SERPL-SCNC: 3.6 MMOL/L (ref 3.5–5.1)
PROCALCITONIN SERPL-MCNC: 0.06 NG/ML (ref ?–0.16)
RBC # BLD AUTO: 4.66 X10(6)UL
SODIUM SERPL-SCNC: 141 MMOL/L (ref 136–145)
TROPONIN I HIGH SENSITIVITY: <3 NG/L
WBC # BLD AUTO: 15.9 X10(3) UL (ref 4–11)

## 2022-05-18 PROCEDURE — 85025 COMPLETE CBC W/AUTO DIFF WBC: CPT | Performed by: EMERGENCY MEDICINE

## 2022-05-18 PROCEDURE — 84484 ASSAY OF TROPONIN QUANT: CPT | Performed by: EMERGENCY MEDICINE

## 2022-05-18 PROCEDURE — 71045 X-RAY EXAM CHEST 1 VIEW: CPT | Performed by: EMERGENCY MEDICINE

## 2022-05-18 PROCEDURE — 99284 EMERGENCY DEPT VISIT MOD MDM: CPT

## 2022-05-18 PROCEDURE — 80048 BASIC METABOLIC PNL TOTAL CA: CPT | Performed by: EMERGENCY MEDICINE

## 2022-05-18 PROCEDURE — 84145 PROCALCITONIN (PCT): CPT | Performed by: EMERGENCY MEDICINE

## 2022-05-18 PROCEDURE — 36415 COLL VENOUS BLD VENIPUNCTURE: CPT

## 2022-05-18 PROCEDURE — 85379 FIBRIN DEGRADATION QUANT: CPT | Performed by: EMERGENCY MEDICINE

## 2022-05-18 RX ORDER — ONDANSETRON 4 MG/1
4 TABLET, ORALLY DISINTEGRATING ORAL ONCE
Status: COMPLETED | OUTPATIENT
Start: 2022-05-18 | End: 2022-05-18

## 2022-05-18 RX ORDER — ACETAMINOPHEN AND CODEINE PHOSPHATE 300; 30 MG/1; MG/1
1 TABLET ORAL EVERY 4 HOURS PRN
Qty: 7 TABLET | Refills: 0 | Status: SHIPPED | OUTPATIENT
Start: 2022-05-18 | End: 2022-05-21

## 2022-05-18 RX ORDER — ONDANSETRON 4 MG/1
4 TABLET, ORALLY DISINTEGRATING ORAL EVERY 4 HOURS PRN
Qty: 10 TABLET | Refills: 0 | Status: SHIPPED | OUTPATIENT
Start: 2022-05-18 | End: 2022-05-25

## 2022-05-18 NOTE — ED INITIAL ASSESSMENT (HPI)
Patient complains of sore throat and cough that started 11 days ago Sunday, tested positive for covid the following Tuesday, here today per persistent cough and shortness of breath.

## 2022-05-18 NOTE — ED QUICK NOTES
Pt presents to the ED for eval of dyspnea. Pt states she initially had a sore throat 8 days ago. Tested (+) for COVID at that time. States yesterday, while taking a shower, she had a hard time catching her breath. Pt still reports dyspnea at rest. +N/V. + cough. +Chills.  Here for further eval.

## 2022-05-19 ENCOUNTER — PATIENT OUTREACH (OUTPATIENT)
Dept: CASE MANAGEMENT | Age: 52
End: 2022-05-19

## 2022-05-20 NOTE — PROGRESS NOTES
2nd attempt PCP apt request    Dr.Joseph Conor Roa   [address]  [phone]  Slade Cooper to contact patient. LMTCB.

## 2022-06-02 ENCOUNTER — TELEPHONE (OUTPATIENT)
Dept: INTERNAL MEDICINE CLINIC | Facility: CLINIC | Age: 52
End: 2022-06-02

## 2022-06-02 NOTE — TELEPHONE ENCOUNTER
Pt requesting appt, had COVID 5/13/22, back at work but continue to have tiredness and sob with walking

## 2022-06-03 ENCOUNTER — OFFICE VISIT (OUTPATIENT)
Dept: INTERNAL MEDICINE CLINIC | Facility: CLINIC | Age: 52
End: 2022-06-03
Payer: COMMERCIAL

## 2022-06-03 ENCOUNTER — TELEPHONE (OUTPATIENT)
Dept: INTERNAL MEDICINE CLINIC | Facility: CLINIC | Age: 52
End: 2022-06-03

## 2022-06-03 VITALS
DIASTOLIC BLOOD PRESSURE: 74 MMHG | SYSTOLIC BLOOD PRESSURE: 130 MMHG | RESPIRATION RATE: 16 BRPM | BODY MASS INDEX: 32.39 KG/M2 | HEART RATE: 60 BPM | OXYGEN SATURATION: 99 % | HEIGHT: 60 IN | WEIGHT: 165 LBS

## 2022-06-03 DIAGNOSIS — Z12.4 SCREENING FOR CERVICAL CANCER: ICD-10-CM

## 2022-06-03 DIAGNOSIS — E78.00 HYPERCHOLESTEROLEMIA: ICD-10-CM

## 2022-06-03 DIAGNOSIS — U07.1 COVID-19: Primary | ICD-10-CM

## 2022-06-03 PROBLEM — I10 PRIMARY HYPERTENSION: Status: ACTIVE | Noted: 2022-06-03

## 2022-06-03 PROCEDURE — 99214 OFFICE O/P EST MOD 30 MIN: CPT | Performed by: INTERNAL MEDICINE

## 2022-06-03 PROCEDURE — 3008F BODY MASS INDEX DOCD: CPT | Performed by: INTERNAL MEDICINE

## 2022-06-03 PROCEDURE — 3078F DIAST BP <80 MM HG: CPT | Performed by: INTERNAL MEDICINE

## 2022-06-03 PROCEDURE — 3075F SYST BP GE 130 - 139MM HG: CPT | Performed by: INTERNAL MEDICINE

## 2022-06-03 NOTE — TELEPHONE ENCOUNTER
Hello,  [facility]  you are well. I have a quick question. As you know Ms. Ibrahim had hysterectomy in 2020. She had a normal Pap smear in 2019. It was recommended to repeat Pap smear in 3 years. You  just saw her in 2022. Does she need another Pap smear? If yes-when will she be due?     Thank you    Arthur Cook

## 2022-06-04 NOTE — TELEPHONE ENCOUNTER
B,  Please see the note from Dr Sirena Sanderson MD)  Please update in HM field. Thanks    MD Melia Prieto MD  Caller: Unspecified Sharmaine Watkins, 12:43 PM)  Adriana Led morning Sandeep Lucero had a hysterectomy with removal of her cervix/uterus, and the results were benign. In addition her prior paps were normal.  She does not need any further pap smears per consensus/asccp guidelines. Have a great day!      Carri Dalal

## 2022-06-11 ENCOUNTER — HOSPITAL ENCOUNTER (OUTPATIENT)
Dept: MAMMOGRAPHY | Facility: HOSPITAL | Age: 52
Discharge: HOME | End: 2022-06-11
Attending: INTERNAL MEDICINE
Payer: COMMERCIAL

## 2022-06-11 DIAGNOSIS — Z12.31 BREAST CANCER SCREENING BY MAMMOGRAM: ICD-10-CM

## 2022-06-11 PROCEDURE — 77067 SCR MAMMO BI INCL CAD: CPT | Performed by: INTERNAL MEDICINE

## 2022-06-11 PROCEDURE — 77063 BREAST TOMOSYNTHESIS BI: CPT | Performed by: INTERNAL MEDICINE

## 2022-06-28 ENCOUNTER — HOSPITAL ENCOUNTER (OUTPATIENT)
Dept: MAMMOGRAPHY | Facility: HOSPITAL | Age: 52
Discharge: HOME | End: 2022-06-28
Attending: INTERNAL MEDICINE
Payer: COMMERCIAL

## 2022-06-28 ENCOUNTER — HOSPITAL ENCOUNTER (OUTPATIENT)
Dept: ULTRASOUND IMAGING | Facility: HOSPITAL | Age: 52
Discharge: HOME | End: 2022-06-28
Attending: INTERNAL MEDICINE
Payer: COMMERCIAL

## 2022-06-28 DIAGNOSIS — R92.8 ABNORMAL MAMMOGRAM: ICD-10-CM

## 2022-06-28 PROCEDURE — 77065 DX MAMMO INCL CAD UNI: CPT | Performed by: INTERNAL MEDICINE

## 2022-06-28 PROCEDURE — 76642 ULTRASOUND BREAST LIMITED: CPT | Performed by: INTERNAL MEDICINE

## 2022-06-28 PROCEDURE — 77061 BREAST TOMOSYNTHESIS UNI: CPT | Performed by: INTERNAL MEDICINE

## 2022-07-19 RX ORDER — HYDROCHLOROTHIAZIDE 12.5 MG/1
TABLET ORAL
Qty: 90 TABLET | Refills: 0 | Status: SHIPPED | OUTPATIENT
Start: 2022-07-19

## 2022-07-26 ENCOUNTER — OFFICE VISIT (OUTPATIENT)
Dept: INTERNAL MEDICINE CLINIC | Facility: CLINIC | Age: 52
End: 2022-07-26
Payer: COMMERCIAL

## 2022-07-26 ENCOUNTER — NURSE TRIAGE (OUTPATIENT)
Dept: INTERNAL MEDICINE CLINIC | Facility: CLINIC | Age: 52
End: 2022-07-26

## 2022-07-26 ENCOUNTER — LAB ENCOUNTER (OUTPATIENT)
Dept: LAB | Facility: HOSPITAL | Age: 52
End: 2022-07-26
Attending: INTERNAL MEDICINE
Payer: COMMERCIAL

## 2022-07-26 VITALS
HEART RATE: 73 BPM | SYSTOLIC BLOOD PRESSURE: 140 MMHG | BODY MASS INDEX: 32.39 KG/M2 | HEIGHT: 60 IN | WEIGHT: 165 LBS | DIASTOLIC BLOOD PRESSURE: 91 MMHG

## 2022-07-26 DIAGNOSIS — R07.89 OTHER CHEST PAIN: Primary | ICD-10-CM

## 2022-07-26 DIAGNOSIS — R07.89 OTHER CHEST PAIN: ICD-10-CM

## 2022-07-26 PROCEDURE — 93005 ELECTROCARDIOGRAM TRACING: CPT

## 2022-07-26 PROCEDURE — 3077F SYST BP >= 140 MM HG: CPT | Performed by: INTERNAL MEDICINE

## 2022-07-26 PROCEDURE — 3008F BODY MASS INDEX DOCD: CPT | Performed by: INTERNAL MEDICINE

## 2022-07-26 PROCEDURE — 3080F DIAST BP >= 90 MM HG: CPT | Performed by: INTERNAL MEDICINE

## 2022-07-26 PROCEDURE — 99214 OFFICE O/P EST MOD 30 MIN: CPT | Performed by: INTERNAL MEDICINE

## 2022-07-26 PROCEDURE — 93010 ELECTROCARDIOGRAM REPORT: CPT | Performed by: INTERNAL MEDICINE

## 2022-07-27 DIAGNOSIS — R94.31 ABNORMAL EKG: Primary | ICD-10-CM

## 2022-08-03 ENCOUNTER — HOSPITAL ENCOUNTER (OUTPATIENT)
Dept: NUCLEAR MEDICINE | Facility: HOSPITAL | Age: 52
Discharge: HOME | End: 2022-08-03
Attending: INTERNAL MEDICINE
Payer: COMMERCIAL

## 2022-08-03 ENCOUNTER — HOSPITAL ENCOUNTER (OUTPATIENT)
Dept: CV DIAGNOSTICS | Facility: HOSPITAL | Age: 52
Discharge: HOME | End: 2022-08-03
Attending: INTERNAL MEDICINE
Payer: COMMERCIAL

## 2022-08-03 DIAGNOSIS — R94.31 ABNORMAL EKG: ICD-10-CM

## 2022-08-03 PROCEDURE — 93016 CV STRESS TEST SUPVJ ONLY: CPT | Performed by: INTERNAL MEDICINE

## 2022-08-03 PROCEDURE — 93018 CV STRESS TEST I&R ONLY: CPT | Performed by: INTERNAL MEDICINE

## 2022-08-03 PROCEDURE — 78452 HT MUSCLE IMAGE SPECT MULT: CPT | Performed by: INTERNAL MEDICINE

## 2022-08-03 PROCEDURE — 93017 CV STRESS TEST TRACING ONLY: CPT | Performed by: INTERNAL MEDICINE

## 2022-08-04 ENCOUNTER — HOSPITAL ENCOUNTER (OUTPATIENT)
Age: 52
Discharge: HOME | End: 2022-08-04
Payer: COMMERCIAL

## 2022-08-04 VITALS
HEART RATE: 73 BPM | TEMPERATURE: 97 F | DIASTOLIC BLOOD PRESSURE: 81 MMHG | SYSTOLIC BLOOD PRESSURE: 141 MMHG | RESPIRATION RATE: 20 BRPM | OXYGEN SATURATION: 100 %

## 2022-08-04 DIAGNOSIS — R20.2 PARESTHESIAS: Primary | ICD-10-CM

## 2022-08-04 PROCEDURE — 99213 OFFICE O/P EST LOW 20 MIN: CPT

## 2022-08-04 PROCEDURE — 99212 OFFICE O/P EST SF 10 MIN: CPT

## 2022-08-04 NOTE — ED INITIAL ASSESSMENT (HPI)
Patient reports intermittent tingling from her left temple that radiates to the left side of her neck for the past few weeks. States she intermittently feels the tingling in her left hand as well. Denies weakness, denies injury/trauma.

## 2022-10-03 NOTE — TELEPHONE ENCOUNTER
Please review refill protocol failed/ no protocol  Requested Prescriptions   Pending Prescriptions Disp Refills    HYDROCHLOROTHIAZIDE 12.5 MG Oral Tab [Pharmacy Med Name: HYDROCHLOROTHIAZIDE 12.5 MG TB] 90 tablet 0     Sig: TAKE 1 TABLET BY MOUTH EVERY DAY        Hypertensive Medications Protocol Failed - 10/1/2022  4:13 PM        Failed - Last BP reading less than 140/90     BP Readings from Last 1 Encounters:  08/04/22 : 141/81                Passed - In person appointment in the past 12 or next 3 months       Recent Outpatient Visits              2 months ago Other chest pain    Deborah Olmstead MD    Office Visit    4 months ago COVID-19    [facility], [facility], Leonela Carroll MD    Office Visit    5 months ago Primary hypertension    [facility], [address] [place] [address] [place], [facility] [place], PA-C    Office Visit    7 months ago [employer] annual routine gynecological examination    [facility], [address] [place] [address], [address] [place] [address] Orlin Rosado MD    Office Visit    8 months ago Hypercholesterolemia    [facility], [address], [place], Leonela Carroll MD    Office Visit     Future Appointments         Provider Department Appt Notes    In 2 months Minesh Lainez MD [email or web address] [facility], [facility] last px 30/89/9714  care partnr policy informed    In 4 months Brook Crowley MD [email or web address] [facility], [address] [place] [address], [place]  annual               Passed - CMP or BMP in past 6 months     Recent Results (from the past 4392 hour(s))   Basic Metabolic Panel (8)    Collection Time: 05/18/22  7:57 AM   Result Value Ref Range    Glucose 105 (H) 70 - 99 mg/dL    Sodium 141 136 - 145 mmol/L    Potassium 3.6 3.5 - 5.1 mmol/L    Chloride 106 98 - 112 mmol/L    CO2 31.0 21.0 - 32.0 mmol/L    Anion Gap 4 0 - 18 mmol/L    BUN 9 7 - 18 mg/dL    Creatinine 1.12 (H) 0.55 - 1.02 mg/dL    BUN/CREA Ratio 8.0 (L) 10.0 - 20.0    Calcium, Total 9.6 8.5 - 10.1 mg/dL    Calculated Osmolality 291 275 - 295 mOsm/kg    GFR, Non- 56 (L) >=60    GFR, -American 65 >=60     *Note: Due to a large number of results and/or encounters for the requested time period, some results have not been displayed. A complete set of results can be found in Results Review.                  Passed - In person appointment or virtual visit in the past 6 months       Recent Outpatient Visits              2 months ago Other chest pain    Yanely Negrete MD    Office Visit    4 months ago COVID-19    [address], [address] Letty Louis MD    Office Visit    5 months ago Primary hypertension    [address], [address] [facility], [facility]    Office Visit    7 months ago [employer] annual routine gynecological examination    [address], [address] [facility] [place], [address] [facility] Christine Hess MD    Office Visit    8 months ago Hypercholesterolemia    [address], [address], Letty Wilder MD    Office Visit     Future Appointments         Provider Department Appt Notes    In 2 months Rommel Yadav MD [address] last px 39/18/8121  care partnr policy informed    In 4 months Sendy Crowley MD [address]  annual               Passed - [place] or GFRAA > 50     GFR Evaluation  GFRAA: 72 , resulted on 5/18/2022

## 2022-10-03 NOTE — TELEPHONE ENCOUNTER
Please review refill protocol failed/ no protocol  Requested Prescriptions   Pending Prescriptions Disp Refills    LOVASTATIN 20 MG Oral Tab [Pharmacy Med Name: LOVASTATIN 20 MG TABLET] 90 tablet 1     Sig: TAKE 1 TABLET BY MOUTH EVERY DAY WITH THE EVENING MEAL        Cholesterol Medication Protocol Failed - 10/1/2022  4:13 PM        Failed - ALT in past 12 months        Failed - LDL in past 12 months        Failed - Last ALT < 80       Lab Results   Component Value Date    ALT 18 06/20/2021             Failed - Last LDL < 130     Lab Results   Component Value Date    LDL 74 06/20/2021               Passed - In person appointment or virtual visit in the past 12 mos or appointment in next 3 mos       Recent Outpatient Visits              2 months ago Other chest pain    Jose F Jeffers MD    Office Visit    4 months ago COVID-19    [facility], [facility], 148 Idris Louis MD    Office Visit    5 months ago Primary hypertension    [facility], [facility] Jordon Coe [facility], [facility]    Office Visit    7 months ago [facility] annual routine gynecological examination    [facility], [facility], [address] [place] [address], [address] [place] [address] Rula Moraes MD    Office Visit    8 months ago Hypercholesterolemia    Hallock PortIdris MD    Office Visit     Future Appointments         Provider Department Appt Notes    In 2 months Hettie Kanner, MD [place] [employer] [place], [facility], Haley last px [id number]  care partnr policy informed    In 4 months Elie Magana, 1100 [facility], [address] [place] [address], [facility]  annual

## 2022-10-04 RX ORDER — LOVASTATIN 20 MG/1
TABLET ORAL
Qty: 90 TABLET | Refills: 0 | Status: SHIPPED | OUTPATIENT
Start: 2022-10-04 | End: 2023-01-10

## 2022-10-04 RX ORDER — HYDROCHLOROTHIAZIDE 12.5 MG/1
12.5 TABLET ORAL DAILY
Qty: 90 TABLET | Refills: 1 | Status: SHIPPED | OUTPATIENT
Start: 2022-10-04

## 2022-12-05 ENCOUNTER — OFFICE VISIT (OUTPATIENT)
Dept: INTERNAL MEDICINE CLINIC | Facility: CLINIC | Age: 52
End: 2022-12-05
Payer: COMMERCIAL

## 2022-12-05 ENCOUNTER — LAB ENCOUNTER (OUTPATIENT)
Dept: LAB | Age: 52
End: 2022-12-05
Attending: INTERNAL MEDICINE
Payer: COMMERCIAL

## 2022-12-05 VITALS
HEIGHT: 60 IN | RESPIRATION RATE: 14 BRPM | BODY MASS INDEX: 31.61 KG/M2 | WEIGHT: 161 LBS | SYSTOLIC BLOOD PRESSURE: 124 MMHG | DIASTOLIC BLOOD PRESSURE: 70 MMHG | HEART RATE: 70 BPM | OXYGEN SATURATION: 98 %

## 2022-12-05 DIAGNOSIS — Z00.00 ADULT GENERAL MEDICAL EXAM: ICD-10-CM

## 2022-12-05 DIAGNOSIS — Z00.00 ADULT GENERAL MEDICAL EXAM: Primary | ICD-10-CM

## 2022-12-05 DIAGNOSIS — Z23 INFLUENZA VACCINE NEEDED: ICD-10-CM

## 2022-12-05 LAB
ALBUMIN SERPL-MCNC: 3.9 G/DL (ref 3.4–5)
ALBUMIN/GLOB SERPL: 1 {RATIO} (ref 1–2)
ALP LIVER SERPL-CCNC: 73 U/L
ALT SERPL-CCNC: 22 U/L
ANION GAP SERPL CALC-SCNC: 3 MMOL/L (ref 0–18)
AST SERPL-CCNC: 16 U/L (ref 15–37)
BILIRUB SERPL-MCNC: 0.4 MG/DL (ref 0.1–2)
BUN BLD-MCNC: 11 MG/DL (ref 7–18)
BUN/CREAT SERPL: 10.2 (ref 10–20)
CALCIUM BLD-MCNC: 9.7 MG/DL (ref 8.5–10.1)
CHLORIDE SERPL-SCNC: 106 MMOL/L (ref 98–112)
CHOLEST SERPL-MCNC: 143 MG/DL (ref ?–200)
CO2 SERPL-SCNC: 31 MMOL/L (ref 21–32)
CREAT BLD-MCNC: 1.08 MG/DL
DEPRECATED RDW RBC AUTO: 41.7 FL (ref 35.1–46.3)
ERYTHROCYTE [DISTWIDTH] IN BLOOD BY AUTOMATED COUNT: 12.8 % (ref 11–15)
EST. AVERAGE GLUCOSE BLD GHB EST-MCNC: 117 MG/DL (ref 68–126)
FASTING PATIENT LIPID ANSWER: YES
FASTING STATUS PATIENT QL REPORTED: YES
GFR SERPLBLD BASED ON 1.73 SQ M-ARVRAT: 61 ML/MIN/1.73M2 (ref 60–?)
GLOBULIN PLAS-MCNC: 4.1 G/DL (ref 2.8–4.4)
GLUCOSE BLD-MCNC: 86 MG/DL (ref 70–99)
HBA1C MFR BLD: 5.7 % (ref ?–5.7)
HCT VFR BLD AUTO: 40.1 %
HDLC SERPL-MCNC: 56 MG/DL (ref 40–59)
HGB BLD-MCNC: 13.2 G/DL
LDLC SERPL CALC-MCNC: 71 MG/DL (ref ?–100)
MCH RBC QN AUTO: 29.2 PG (ref 26–34)
MCHC RBC AUTO-ENTMCNC: 32.9 G/DL (ref 31–37)
MCV RBC AUTO: 88.7 FL
NONHDLC SERPL-MCNC: 87 MG/DL (ref ?–130)
OSMOLALITY SERPL CALC.SUM OF ELEC: 289 MOSM/KG (ref 275–295)
PLATELET # BLD AUTO: 282 10(3)UL (ref 150–450)
POTASSIUM SERPL-SCNC: 3.6 MMOL/L (ref 3.5–5.1)
PROT SERPL-MCNC: 8 G/DL (ref 6.4–8.2)
RBC # BLD AUTO: 4.52 X10(6)UL
SODIUM SERPL-SCNC: 140 MMOL/L (ref 136–145)
TRIGL SERPL-MCNC: 84 MG/DL (ref 30–149)
TSI SER-ACNC: 1.25 MIU/ML (ref 0.36–3.74)
VLDLC SERPL CALC-MCNC: 13 MG/DL (ref 0–30)
WBC # BLD AUTO: 9.1 X10(3) UL (ref 4–11)

## 2022-12-05 PROCEDURE — 36415 COLL VENOUS BLD VENIPUNCTURE: CPT

## 2022-12-05 PROCEDURE — 90471 IMMUNIZATION ADMIN: CPT | Performed by: INTERNAL MEDICINE

## 2022-12-05 PROCEDURE — 90686 IIV4 VACC NO PRSV 0.5 ML IM: CPT | Performed by: INTERNAL MEDICINE

## 2022-12-05 PROCEDURE — 84443 ASSAY THYROID STIM HORMONE: CPT

## 2022-12-05 PROCEDURE — 99396 PREV VISIT EST AGE 40-64: CPT | Performed by: INTERNAL MEDICINE

## 2022-12-05 PROCEDURE — 3074F SYST BP LT 130 MM HG: CPT | Performed by: INTERNAL MEDICINE

## 2022-12-05 PROCEDURE — 80053 COMPREHEN METABOLIC PANEL: CPT

## 2022-12-05 PROCEDURE — 80061 LIPID PANEL: CPT

## 2022-12-05 PROCEDURE — 85027 COMPLETE CBC AUTOMATED: CPT

## 2022-12-05 PROCEDURE — 3078F DIAST BP <80 MM HG: CPT | Performed by: INTERNAL MEDICINE

## 2022-12-05 PROCEDURE — 83036 HEMOGLOBIN GLYCOSYLATED A1C: CPT

## 2022-12-05 PROCEDURE — 3008F BODY MASS INDEX DOCD: CPT | Performed by: INTERNAL MEDICINE

## 2023-01-10 RX ORDER — LOVASTATIN 20 MG/1
TABLET ORAL
Qty: 90 TABLET | Refills: 0 | Status: SHIPPED | OUTPATIENT
Start: 2023-01-10

## 2023-02-27 ENCOUNTER — OFFICE VISIT (OUTPATIENT)
Dept: OBGYN CLINIC | Facility: CLINIC | Age: 53
End: 2023-02-27

## 2023-02-27 VITALS — BODY MASS INDEX: 32 KG/M2 | SYSTOLIC BLOOD PRESSURE: 114 MMHG | WEIGHT: 162 LBS | DIASTOLIC BLOOD PRESSURE: 76 MMHG

## 2023-02-27 DIAGNOSIS — Z01.419 ENCOUNTER FOR GYNECOLOGICAL EXAMINATION WITHOUT ABNORMAL FINDING: Primary | ICD-10-CM

## 2023-02-27 PROCEDURE — 99396 PREV VISIT EST AGE 40-64: CPT | Performed by: OBSTETRICS & GYNECOLOGY

## 2023-02-27 PROCEDURE — 3078F DIAST BP <80 MM HG: CPT | Performed by: OBSTETRICS & GYNECOLOGY

## 2023-02-27 PROCEDURE — 3074F SYST BP LT 130 MM HG: CPT | Performed by: OBSTETRICS & GYNECOLOGY

## 2023-02-27 RX ORDER — VITAMIN E (DL,TOCOPHERYL ACET) 180 MG
CAPSULE ORAL
COMMUNITY

## 2023-02-28 ENCOUNTER — ORDER TRANSCRIPTION (OUTPATIENT)
Dept: ADMINISTRATIVE | Facility: HOSPITAL | Age: 53
End: 2023-02-28

## 2023-02-28 DIAGNOSIS — Z12.31 ENCOUNTER FOR SCREENING MAMMOGRAM FOR MALIGNANT NEOPLASM OF BREAST: Primary | ICD-10-CM

## 2023-03-01 ENCOUNTER — PATIENT MESSAGE (OUTPATIENT)
Dept: OBGYN CLINIC | Facility: CLINIC | Age: 53
End: 2023-03-01

## 2023-03-01 DIAGNOSIS — Z12.31 SCREENING MAMMOGRAM FOR BREAST CANCER: Primary | ICD-10-CM

## 2023-03-01 RX ORDER — ACETAMINOPHEN AND CODEINE PHOSPHATE 120; 12 MG/5ML; MG/5ML
0.35 SOLUTION ORAL DAILY
Qty: 84 TABLET | Refills: 3 | Status: SHIPPED | OUTPATIENT
Start: 2023-03-01

## 2023-03-01 NOTE — TELEPHONE ENCOUNTER
From: Paty Ibrahim  To: Krystian Alejo MD  Sent: 3/1/2023 8:35 AM CST  Subject: Prescription refill    Good morning, Dr. Katia Alejo,     I hope all is well. Can you please refill my birth control subscription? Also, when I tried to schedule an appointment for my mammogram the  could not find an order for me.

## 2023-04-04 ENCOUNTER — HOSPITAL ENCOUNTER (OUTPATIENT)
Age: 53
Discharge: HOME | End: 2023-04-04
Payer: COMMERCIAL

## 2023-04-04 VITALS
TEMPERATURE: 99 F | HEART RATE: 73 BPM | DIASTOLIC BLOOD PRESSURE: 78 MMHG | SYSTOLIC BLOOD PRESSURE: 133 MMHG | RESPIRATION RATE: 18 BRPM | OXYGEN SATURATION: 100 %

## 2023-04-04 DIAGNOSIS — J06.9 VIRAL UPPER RESPIRATORY TRACT INFECTION: Primary | ICD-10-CM

## 2023-04-04 LAB — SARS-COV-2 RNA RESP QL NAA+PROBE: NOT DETECTED

## 2023-04-04 PROCEDURE — 99213 OFFICE O/P EST LOW 20 MIN: CPT

## 2023-04-04 PROCEDURE — 99212 OFFICE O/P EST SF 10 MIN: CPT

## 2023-04-04 NOTE — ED INITIAL ASSESSMENT (HPI)
Patient arrived ambulatory to room c/o symptoms that started 2 days ago. +nasal congestion +\"scratchy throat\" +non productive cough. Pt denies taking temperatures but c/o chills. No n/v/d. Easy non labored respirations.

## 2023-04-05 ENCOUNTER — TELEPHONE (OUTPATIENT)
Dept: INTERNAL MEDICINE CLINIC | Facility: CLINIC | Age: 53
End: 2023-04-05

## 2023-04-05 RX ORDER — HYDROCHLOROTHIAZIDE 12.5 MG/1
12.5 TABLET ORAL DAILY
Qty: 90 TABLET | Refills: 3 | Status: SHIPPED | OUTPATIENT
Start: 2023-04-05 | End: 2023-04-06

## 2023-04-05 NOTE — TELEPHONE ENCOUNTER
Patient states she was seen at the Urgent Care yesterday for nasal congestion and states she was informed of \"respiratory virus. \"  Patient requesting medications as she was not prescribed anything. Patient denies sore throat, wheezing, shortness of breath or other worsening symptoms. Advised of home care [facility], over the counter sinus medication with Tylenol, tea with honey and soup broth. Also advised to keep monitoring symptoms and to call back with questions or worsening symptoms and that her symptoms should improve on own within a few days. Patient states understanding.

## 2023-04-05 NOTE — TELEPHONE ENCOUNTER
Refill passed per [email or web address] protocol    Requested Prescriptions   Pending Prescriptions Disp Refills    HYDROCHLOROTHIAZIDE 12.5 MG Oral Tab [Pharmacy Med Name: HYDROCHLOROTHIAZIDE 12.5 MG TB] 90 tablet 1     Sig: TAKE 1 TABLET BY MOUTH EVERY DAY       Hypertensive Medications Protocol Passed - 4/5/2023 12:26 AM        Passed - In person appointment in the past 12 or next 3 months     Recent Outpatient Visits              1 month ago Encounter for gynecological examination without abnormal finding    [facility]    [facility] Visit    4 months ago Adult general medical exam    Paramjit Arce MD    Office Visit    8 months ago Other chest pain    Kenia Arce MD    Office Visit    10 months ago COVID-19    Paramjit Arce MD    Office Visit    11 months ago Primary hypertension    [facility], [address] [place], [place] [address]Analy PA-C    Office Visit          Future Appointments         Provider Department Appt Notes    In 2 months Norma Rollins MD [address], 148 [facility] 6M f/u    In 2 months [facility] OF [address] Last mamm 6/11/22    In 10 months Gerald Bethea MD [address], [address], [place] - OB/GYN annual               Passed - Last BP reading less than 140/90     BP Readings from Last 1 Encounters:  04/04/23 : 133/78              Passed - CMP or BMP in past 6 months     Recent Results (from the past 4392 hour(s))   COMP METABOLIC PANEL (14)    Collection Time: 12/05/22 10:10 AM   Result Value Ref Range    Glucose 86 70 - 99 mg/dL    Sodium 140 136 - 145 mmol/L    Potassium 3.6 3.5 - 5.1 mmol/L    Chloride 106 98 - 112 mmol/L    CO2 31.0 21.0 - 32.0 mmol/L    Anion Gap 3 0 - 18 mmol/L    BUN 11 7 - 18 mg/dL    Creatinine 1.08 (H) 0.55 - 1.02 mg/dL    BUN/CREA Ratio 10.2 10.0 - 20.0    Calcium, Total 9.7 8.5 - 10.1 mg/dL    Calculated Osmolality 289 275 - 295 mOsm/kg    eGFR-Cr 61 >=60 mL/min/1.73m2    ALT 22 13 - 56 U/L    AST 16 15 - 37 U/L    Alkaline Phosphatase 73 41 - 108 U/L    Bilirubin, Total 0.4 0.1 - 2.0 mg/dL    Total Protein 8.0 6.4 - 8.2 g/dL    Albumin 3.9 3.4 - 5.0 g/dL    Globulin  4.1 2.8 - 4.4 g/dL    A/G Ratio 1.0 1.0 - 2.0    Patient Fasting for CMP? Yes      *Note: Due to a large number of results and/or encounters for the requested time period, some results have not been displayed. A complete set of results can be found in Results Review.                Passed - In person appointment or virtual visit in the past 6 months     Recent Outpatient Visits              1 month ago Encounter for gynecological examination without abnormal finding    [facility]    Office Visit    4 months ago Adult general medical exam    Hugh Gallagher MD    Office Visit    8 months ago Other chest pain    Kenia Gallagher MD    Office Visit    10 months ago COVID-19    Hugh Gallagher MD    Office Visit    11 months ago Primary hypertension    [facility], 148 Kenia Louis PA-C    Office Visit          Future Appointments         Provider Department Appt Notes    In 2 months MD Sathya Cooper, 148 [facility] Kenia Coe 6M f/u    In 2 months [facility] OF [address] Health Last mamm 6/11/22    In 10 months Eav Malloy MD [place], 12 [facility], 69 [facility] Passed - EGFRCR or GFRAA > 50     GFR Evaluation  EGFRCR: 61 , resulted on 12/5/2022

## 2023-04-06 ENCOUNTER — TELEMEDICINE (OUTPATIENT)
Facility: CLINIC | Age: 53
End: 2023-04-06

## 2023-04-06 DIAGNOSIS — H66.90 ACUTE OTITIS MEDIA, UNSPECIFIED OTITIS MEDIA TYPE: Primary | ICD-10-CM

## 2023-04-06 PROCEDURE — 99213 OFFICE O/P EST LOW 20 MIN: CPT | Performed by: INTERNAL MEDICINE

## 2023-04-06 RX ORDER — HYDROCHLOROTHIAZIDE 12.5 MG/1
12.5 TABLET ORAL DAILY
Qty: 90 TABLET | Refills: 3 | Status: SHIPPED | OUTPATIENT
Start: 2023-04-06

## 2023-04-06 RX ORDER — HYDROCHLOROTHIAZIDE 12.5 MG/1
12.5 TABLET ORAL DAILY
Qty: 90 TABLET | Refills: 3 | Status: CANCELLED | OUTPATIENT
Start: 2023-04-06

## 2023-04-06 RX ORDER — AMOXICILLIN AND CLAVULANATE POTASSIUM 875; 125 MG/1; MG/1
1 TABLET, FILM COATED ORAL 2 TIMES DAILY
Qty: 14 TABLET | Refills: 0 | Status: SHIPPED | OUTPATIENT
Start: 2023-04-06 | End: 2023-04-13

## 2023-04-06 NOTE — TELEPHONE ENCOUNTER
Patient states she thinks now she has a sinus infection. Patient having thick yellow mucous and sinus pain. Patient taking Gatorade and benadryl at night, not helping. Patient scheduled for a video visit. Patient advised to complete the E-check in [employer], if active. Understands to follow the prompts and links to complete the visit. Patient advised that there may be a co-pay involved with this type of visit. Patient agreed to proceed, they understand the provider may be calling from a blocked, or unknown phone number on their caller ID and they know to answer the phone.

## 2023-04-12 RX ORDER — LOVASTATIN 20 MG/1
TABLET ORAL
Qty: 90 TABLET | Refills: 3 | Status: SHIPPED | OUTPATIENT
Start: 2023-04-12

## 2023-04-12 NOTE — TELEPHONE ENCOUNTER
Refill passed per [place] protocol.      Requested Prescriptions   Pending Prescriptions Disp Refills    LOVASTATIN 20 MG Oral Tab [Pharmacy Med Name: LOVASTATIN 20 MG TABLET] 90 tablet 0     Sig: TAKE 1 TABLET BY MOUTH EVERY DAY WITH THE EVENING MEAL       Cholesterol Medication Protocol Passed - 4/12/2023  2:12 PM        Passed - ALT in past 12 months        Passed - LDL in past 12 months        Passed - Last ALT < 80     Lab Results   Component Value Date    ALT 22 12/05/2022             Passed - Last LDL < 130     Lab Results   Component Value Date    LDL 71 12/05/2022               Passed - In person appointment or virtual visit in the past 12 mos or appointment in next 3 mos     Recent Outpatient Visits              6 days ago Acute otitis media, unspecified otitis media type    [facility], [facility], MD Hugh    Telemedicine    1 month ago Encounter for gynecological examination without abnormal finding    [facility], [facility], Hortensia Sanchez MD    Office Visit    4 months ago Adult general medical exam    Hugh Galarza MD    Office Visit    8 months ago Other chest pain    Kenia Galarza MD    Office Visit    10 months ago COVID-19    Hugh Galarza MD    Office Visit          Future Appointments         Provider Department Appt Notes    In 1 month MD Farhana Pritchard, Kenia Linder 6M f/u    In 2 months [facility] OF THE [address] Health Last mamm 6/11/22    In 10 months 540 The MD Farhana Swain, 12 [facility], [address] [place] [address] annual                     Recent Outpatient Visits              6 days ago Acute otitis media, unspecified otitis media type    [facility], [place] 3[facility], Chad Nickerson MD    Telemedicine    1 month ago Encounter for gynecological examination without abnormal finding    [place], 900 [facility], Pat Henry MD    Office Visit    4 months ago Adult general medical exam    Chad Bess MD    Office Visit    8 months ago Other chest pain    Kenia Bess MD    Office Visit    10 months ago COVID-19    Chad Bess MD    Office Visit             Future Appointments         Provider Department Appt Notes    In 1 month Cabrera Kulkarni MD [address], 148 [facility] f[facility]u    In 2 months [facility] OF THE [place] [address] for 1500 [facility] 6/11/22    In 10 months Pat Garcia MD [address], 12 [facility], [address] [place] [address]

## 2023-05-08 ENCOUNTER — OFFICE VISIT (OUTPATIENT)
Dept: INTERNAL MEDICINE CLINIC | Facility: CLINIC | Age: 53
End: 2023-05-08

## 2023-05-08 VITALS
HEIGHT: 60 IN | BODY MASS INDEX: 32 KG/M2 | DIASTOLIC BLOOD PRESSURE: 76 MMHG | SYSTOLIC BLOOD PRESSURE: 110 MMHG | OXYGEN SATURATION: 99 % | RESPIRATION RATE: 14 BRPM | WEIGHT: 163 LBS | HEART RATE: 70 BPM

## 2023-05-08 DIAGNOSIS — E78.00 HYPERCHOLESTEROLEMIA: ICD-10-CM

## 2023-05-08 DIAGNOSIS — I10 PRIMARY HYPERTENSION: Primary | ICD-10-CM

## 2023-05-08 DIAGNOSIS — Z12.31 BREAST CANCER SCREENING BY MAMMOGRAM: ICD-10-CM

## 2023-05-08 PROCEDURE — 3008F BODY MASS INDEX DOCD: CPT | Performed by: INTERNAL MEDICINE

## 2023-05-08 PROCEDURE — 99214 OFFICE O/P EST MOD 30 MIN: CPT | Performed by: INTERNAL MEDICINE

## 2023-05-08 PROCEDURE — 3074F SYST BP LT 130 MM HG: CPT | Performed by: INTERNAL MEDICINE

## 2023-05-08 PROCEDURE — 3078F DIAST BP <80 MM HG: CPT | Performed by: INTERNAL MEDICINE

## 2023-06-14 ENCOUNTER — OFFICE VISIT (OUTPATIENT)
Dept: INTERNAL MEDICINE CLINIC | Facility: CLINIC | Age: 53
End: 2023-06-14

## 2023-06-14 VITALS
HEIGHT: 60 IN | DIASTOLIC BLOOD PRESSURE: 80 MMHG | RESPIRATION RATE: 14 BRPM | SYSTOLIC BLOOD PRESSURE: 124 MMHG | HEART RATE: 84 BPM | OXYGEN SATURATION: 99 % | BODY MASS INDEX: 32.39 KG/M2 | WEIGHT: 165 LBS

## 2023-06-14 DIAGNOSIS — Z23 NEED FOR DTAP VACCINATION: ICD-10-CM

## 2023-06-14 DIAGNOSIS — H93.8X2 CONGESTION OF LEFT EAR: ICD-10-CM

## 2023-06-14 DIAGNOSIS — I10 PRIMARY HYPERTENSION: Primary | ICD-10-CM

## 2023-06-14 PROCEDURE — 90715 TDAP VACCINE 7 YRS/> IM: CPT | Performed by: INTERNAL MEDICINE

## 2023-06-14 PROCEDURE — 3074F SYST BP LT 130 MM HG: CPT | Performed by: INTERNAL MEDICINE

## 2023-06-14 PROCEDURE — 3079F DIAST BP 80-89 MM HG: CPT | Performed by: INTERNAL MEDICINE

## 2023-06-14 PROCEDURE — 99214 OFFICE O/P EST MOD 30 MIN: CPT | Performed by: INTERNAL MEDICINE

## 2023-06-14 PROCEDURE — 3008F BODY MASS INDEX DOCD: CPT | Performed by: INTERNAL MEDICINE

## 2023-06-14 PROCEDURE — 90471 IMMUNIZATION ADMIN: CPT | Performed by: INTERNAL MEDICINE

## 2023-06-18 ENCOUNTER — PATIENT MESSAGE (OUTPATIENT)
Dept: OBGYN CLINIC | Facility: CLINIC | Age: 53
End: 2023-06-18

## 2023-06-19 RX ORDER — METRONIDAZOLE 7.5 MG/G
1 GEL VAGINAL NIGHTLY
Qty: 70 G | Refills: 0 | Status: SHIPPED | OUTPATIENT
Start: 2023-06-19 | End: 2023-06-24

## 2023-06-19 NOTE — TELEPHONE ENCOUNTER
From: Paty Ibrahim  To: Krystian Ratliff MD  Sent: 6/18/2023 9:03 PM CDT  Subject: Medication request    Hi Dr. Kel Ratliff,     I'm currently experiencing some vaginal discomfort. A small amount of discharge and some soreness. It's only been a couple of days. I've tried to treat it with vaginal suppositories boric acid and herb. It's doesn't feel like it's   helping. I believe the issue is due yo my phone balance being off due yo not drinking enough water. Is it possible for you scribe me something?      Thank you,    Rita Son

## 2023-06-29 ENCOUNTER — HOSPITAL ENCOUNTER (OUTPATIENT)
Dept: MAMMOGRAPHY | Facility: HOSPITAL | Age: 53
Discharge: HOME | End: 2023-06-29
Attending: OBSTETRICS & GYNECOLOGY
Payer: COMMERCIAL

## 2023-06-29 DIAGNOSIS — Z12.31 SCREENING MAMMOGRAM FOR BREAST CANCER: ICD-10-CM

## 2023-06-29 PROCEDURE — 77063 BREAST TOMOSYNTHESIS BI: CPT | Performed by: OBSTETRICS & GYNECOLOGY

## 2023-06-29 PROCEDURE — 77067 SCR MAMMO BI INCL CAD: CPT | Performed by: OBSTETRICS & GYNECOLOGY

## 2023-07-06 RX ORDER — LOVASTATIN 20 MG/1
TABLET ORAL
Qty: 90 TABLET | Refills: 3 | Status: CANCELLED | OUTPATIENT
Start: 2023-07-06

## 2023-07-07 RX ORDER — FLUTICASONE PROPIONATE 50 MCG
1 SPRAY, SUSPENSION (ML) NASAL 2 TIMES DAILY
Qty: 3 EACH | Refills: 3 | Status: SHIPPED | OUTPATIENT
Start: 2023-07-07

## 2023-07-07 NOTE — TELEPHONE ENCOUNTER
Please review. Protocol failed / Has no protocol. Requested Prescriptions   Pending Prescriptions Disp Refills    hydroCHLOROthiazide 12.5 MG Oral Tab 90 tablet 3     Sig: Take 1 tablet (12.5 mg total) by mouth daily. Hypertensive Medications Protocol Failed - 7/6/2023  4:14 PM        Failed - CMP or BMP in past 6 months     No results found for this or any previous visit (from the past 4392 hour(s)).             Passed - In person appointment in the past 12 or next 3 months     Recent Outpatient Visits              3 weeks ago Primary hypertension    Paramjit Arce MD    Office Visit    2 months ago Primary hypertension    Paramjit Arce MD    Office Visit    3 months ago Acute otitis media, unspecified otitis media type    [facility], 602 [facility], Paramjit De La Garza MD    Telemedicine    4 months ago Encounter for gynecological examination without abnormal finding    [facility], [address] [place] [address], Gerald Del Valle MD    Office Visit    7 months ago Adult general medical exam    Bunny Garcia MD    Office Visit          Future Appointments         Provider Department Appt Notes    In 4 months Norma Rollins MD [address], [address] FU    In 7 months Gerald Bethea MD [address], [address] annual               Passed - Last BP reading less than 140/90     BP Readings from Last 1 Encounters:  06/14/23 : 124/80              Passed - In person appointment or virtual visit in the past 6 months     Recent Outpatient Visits              3 weeks ago Primary hypertension    Paramjit Arce MD    Office Visit    2 months ago Primary hypertension     Socorro Browne MD    Office Visit    3 months ago Acute otitis media, unspecified otitis media type    [facility], 602 [facility] [place]    Telemedicine    4 months ago Encounter for gynecological examination without abnormal finding    [facility] Kathrine Sandhu MD    Office Visit    7 months ago Adult general medical exam    Sania Ferreira MD    Office Visit          Future Appointments         Provider Department Appt Notes    In 4 months Drea Kenny MD [employer], 148 [facility] 6M FU    In 7 months Kathrine Russell MD [address] [place], 12 [facility], [address] [place] [address] annual               Passed - [facility] or GFRAA > 50     GFR Evaluation  EGFRCR: 61 , resulted on 2022           Signed Prescriptions Disp Refills    fluticasone propionate 50 MCG/ACT Nasal Suspension 3 each 3     Si spray by Nasal route 2 (two) times daily.        Allergy Medication Protocol Passed - 2023  4:14 PM        Passed - In person appointment or virtual visit in the past 12 mos or appointment in next 3 mos     Recent Outpatient Visits              3 weeks ago Primary hypertension     Socorro Browne MD    Office Visit    2 months ago Primary hypertension     [facility] Socorro Spencer MD    Office Visit    3 months ago Acute otitis media, unspecified otitis media type    Socorro Montoya MD    Telemedicine    4 months ago Encounter for gynecological examination without abnormal finding    906 [facility] Theresa Rizzo MD    Office Visit    7 months ago Adult general medical exam    Theodore Hassan MD    Office Visit          Future Appointments         Provider Department Appt Notes    In 4 months Melia Stewart MD [address], 148 [facility] FU    In 7 months Mouna Crowley MD [address], 12 [facility], 69 [facility] annual                  Future Appointments         Provider Department Appt Notes    In 4 months Melia Stewart MD [facility], 148 [facility] FU    In 7 months Mouna Crowley MD [address], 12 [facility], [address] [place] [address] annual           Recent Outpatient Visits              3 weeks ago Primary hypertension    Hugh Auguste MD    Office Visit    2 months ago Primary hypertension    Hugh Auguste MD    Office Visit    3 months ago Acute otitis media, unspecified otitis media type    [facility], 602 [facility], MD Hugh    Telemedicine    4 months ago Encounter for gynecological examination without abnormal finding    906 [facility], Mouna Smith MD    Office Visit    7 months ago Adult general medical exam    Hugh Auguste MD    Office Visit

## 2023-07-07 NOTE — TELEPHONE ENCOUNTER
Refill passed per [place] [facility] protocol. Requested Prescriptions   Pending Prescriptions Disp Refills    fluticasone propionate 50 MCG/ACT Nasal Suspension 16 mL 1     Sig: USE 1 SPRAY IN EACH NOSTRIL 2 TIMES EVERY DAY       Allergy Medication Protocol Passed - 7/6/2023  4:14 PM        Passed - In person appointment or virtual visit in the past 12 mos or appointment in next 3 mos     Recent Outpatient Visits              3 weeks ago Primary hypertension    Teresa Feliz MD    Office Visit    2 months ago Primary hypertension    Geneva Brenner MD    Office Visit    3 months ago Acute otitis media, unspecified otitis media type    [facility], 602 [facility], Geneva Henry MD    Telemedicine    4 months ago Encounter for gynecological examination without abnormal finding    906 [facility], Kate Urias MD    Office Visit    7 months ago Adult general medical exam    Teresa Feliz MD    Office Visit          Future Appointments         Provider Department Appt Notes    In 4 months Stephany Tello MD [address] FU    In 7 months Malgorzata Oropeza, Kate Urias MD [facility] hospital, [address] [place] [address] annual                 hydroCHLOROthiazide 12.5 MG Oral Tab 90 tablet 3     Sig: Take 1 tablet (12.5 mg total) by mouth daily. Hypertensive Medications Protocol Failed - 7/6/2023  4:14 PM        Failed - CMP or BMP in past 6 months     No results found for this or any previous visit (from the past 4392 hour(s)).             Passed - In person appointment in the past 12 or next 3 months     Recent Outpatient Visits              3 weeks ago Primary hypertension    [facility] Linda Mitchell MD    Office Visit    2 months ago Primary hypertension    Linda De Jesus MD    Office Visit    3 months ago Acute otitis media, unspecified otitis media type    [facility], [address] [place] [address], Linda Mejía MD    Telemedicine    4 months ago Encounter for gynecological examination without abnormal finding    6 [facility], Khloe Oquendo MD    Office Visit    7 months ago Adult general medical exam    Stacy Alba MD    Office Visit          Future Appointments         Provider Department Appt Notes    In 4 months Angelica Salomon MD [address], [address] FU    In 7 months Khloe Crowley MD [address], 12 [facility], [address] [place] [address] annual               Passed - Last BP reading less than 140/90     BP Readings from Last 1 Encounters:  06/14/23 : 124/80              Passed - In person appointment or virtual visit in the past 6 months     Recent Outpatient Visits              3 weeks ago Primary hypertension    Linda De Jesus MD    Office Visit    2 months ago Primary hypertension    Linda De Jesus MD    Office Visit    3 months ago Acute otitis media, unspecified otitis media type    [facility], [address] [place] [address], Linda Mejía MD    Telemedicine    4 months ago Encounter for gynecological examination without abnormal finding    6 [facility], Khloe Oquendo MD    Office Visit    7 months ago Adult general medical exam    Linda De Jesus MD Office Visit          Future Appointments         Provider Department Appt Notes    In 4 months Chicho Montana MD [address], 148 [facility] FU    In 7 months Gerardo Crowley MD [address], Main [address], 69 [facility] annual               Passed - [facility] or GFRAA > 50     GFR Evaluation  EGFRCR: 61 , resulted on 12/5/2022             Future Appointments         Provider Department Appt Notes    In 4 months Chicho Montana MD [address] [facility], [address] [place] Co[place] FU    In 7 months Gerardo Crowley MD [address], 12 [facility], [address] [place] [address] annual          Recent Outpatient Visits              3 weeks ago Primary hypertension    Chantal Cartwright MD    Office Visit    2 months ago Primary hypertension    Chantal Cartwright MD    Office Visit    3 months ago Acute otitis media, unspecified otitis media type    [facility], 602 [facility] [place], Chantal Fraire MD    Telemedicine    4 months ago Encounter for gynecological examination without abnormal finding    906 [facility], Gerardo Ching MD    Office Visit    7 months ago Adult general medical exam    Chantal Cartwright MD    Office Visit

## 2023-07-08 RX ORDER — HYDROCHLOROTHIAZIDE 12.5 MG/1
12.5 TABLET ORAL DAILY
Qty: 90 TABLET | Refills: 3 | Status: SHIPPED | OUTPATIENT
Start: 2023-07-08

## 2023-08-04 ENCOUNTER — TELEMEDICINE (OUTPATIENT)
Dept: OBGYN CLINIC | Facility: CLINIC | Age: 53
End: 2023-08-04

## 2023-08-04 DIAGNOSIS — Z30.41 ORAL CONTRACEPTIVE PILL SURVEILLANCE: ICD-10-CM

## 2023-08-04 DIAGNOSIS — N95.1 MENOPAUSAL SYNDROME (HOT FLUSHES): Primary | ICD-10-CM

## 2023-08-04 RX ORDER — VENLAFAXINE HYDROCHLORIDE 37.5 MG/1
75 CAPSULE, EXTENDED RELEASE ORAL DAILY
Qty: 60 CAPSULE | Refills: 1 | Status: SHIPPED | OUTPATIENT
Start: 2023-08-04

## 2023-08-04 RX ORDER — VENLAFAXINE HYDROCHLORIDE 37.5 MG/1
37.5 CAPSULE, EXTENDED RELEASE ORAL DAILY
Qty: 60 CAPSULE | Refills: 0 | Status: CANCELLED | OUTPATIENT
Start: 2023-08-04

## 2023-08-04 NOTE — PROGRESS NOTES
HPI:   Haile Lucia is a 46year old female who presents for a hx of telephone visit with a hx of hot flushes all times of the day, worse at night, per pt. Not sleeping well. Pt on lovastatin for hypercholesterolimia,  hx of htn, hx of migraines without aura usually tx with excedrin migraine and caffeine. Pt on multivitamins. Pt counseled on possible use of effexor at lowest dose. Pt has hx of anxiety, pt thinks that it may help with her anxiety. Wt Readings from Last 6 Encounters:  06/14/23 : 165 lb (74.8 kg)  05/08/23 : 163 lb (73.9 kg)  02/27/23 : 162 lb (73.5 kg)  12/05/22 : 161 lb (73 kg)  07/26/22 : 165 lb (74.8 kg)  06/03/22 : 165 lb (74.8 kg)    There is no height or weight on file to calculate BMI. Cholesterol, Total (mg/dL)   Date Value   12/05/2022 143   06/20/2021 154   06/06/2020 152     HDL Cholesterol (mg/dL)   Date Value   12/05/2022 56   06/20/2021 65 (H)   06/06/2020 52     LDL Cholesterol (mg/dL)   Date Value   12/05/2022 71   06/20/2021 74   06/06/2020 78     AST (U/L)   Date Value   12/05/2022 16   06/20/2021 18   06/06/2020 11 (L)     ALT (U/L)   Date Value   12/05/2022 22   06/20/2021 18   06/06/2020 15        Current Outpatient Medications   Medication Sig Dispense Refill    hydroCHLOROthiazide 12.5 MG Oral Tab Take 1 tablet (12.5 mg total) by mouth daily. 90 tablet 3    fluticasone propionate 50 MCG/ACT Nasal Suspension 1 spray by Nasal route 2 (two) times daily. 3 each 3    Lovastatin 20 MG Oral Tab TAKE 1 TABLET BY MOUTH EVERY DAY WITH THE EVENING MEAL 90 tablet 3    Norethindrone 0.35 MG Oral Tab Take 1 tablet (0.35 mg total) by mouth daily. 84 tablet 3    Probiotic Product (PROBIOTIC ACIDOPHILUS) Oral Chew Tab Chew by mouth. Cholecalciferol (VITAMIN D) 50 MCG (2000 UT) Oral Cap Take by mouth. Multiple Vitamins-Minerals (WOMENS MULTIVITAMIN PLUS) Oral Tab Take by mouth.         Past Medical History:   Diagnosis Date    Dehydration 1991    Depression     High cholesterol     History of total hysterectomy     Migraines     Missed      Other and unspecified hyperlipidemia     Other ill-defined conditions(799.89) 2007    \"ACS-US\". \"Endometrial curettings/Cervical biopsy\"    Pregnancy     Pregnancy     Pregnancy     Preoperative testing 2020    Visual impairment       Past Surgical History:   Procedure Laterality Date    COLONOSCOPY      HYSTERECTOMY  2020    INDUCED  17-24 WEEKS            OTHER SURGICAL HISTORY      root cancel, with infection and crown replacement       Family History   Problem Relation Age of Onset    Cancer Father         Throat. Cause of death. Psychiatric Father         alcoholic    Heart Disease Maternal Grandmother 80        Cause of death    Psychiatric Cousin         bipolar      Social History:   Social History     Socioeconomic History    Marital status: Single   Tobacco Use    Smoking status: Never    Smokeless tobacco: Never   Substance and Sexual Activity    Alcohol use: Not Currently     Alcohol/week: 0.0 standard drinks of alcohol     Comment: Socially, 1 drink a year.     Drug use: No    Sexual activity: Never   Other Topics Concern    Caffeine Concern Yes     Comment: Soda, coffee - 1 cup per day             REVIEW OF SYSTEMS:   GENERAL: feels well otherwise  SKIN: denies any unusual skin lesions  EYES:denies blurred vision or double vision  HEENT: denies nasal congestion, sinus pain or ST  LUNGS: denies shortness of breath with exertion  CARDIOVASCULAR: denies chest pain on exertion  GI: denies abdominal pain,denies heartburn  : denies dysuria, vaginal discharge or itching,periods regular   MUSCULOSKELETAL: denies back pain  NEURO: denies headaches  PSYCHE: denies depression or anxiety  HEMATOLOGIC: denies hx of anemia  ENDOCRINE: denies thyroid history  ALL/ASTHMA: denies hx of allergy or asthma    EXAM:   LMP 2020 (LMP Unknown)   There is no height or weight on file to calculate BMI. GENERAL: well developed, well nourished,in no apparent distress  NEURO: Oriented times three  Narrative   PROCEDURE: [facility] HAKAN 2D+3D SCREENING BILAT ([id number])     COMPARISON: [facility], [facility]. for [place], [facility] HAKAN 2D+3D SCREENING BILAT ([id number]), 6/03/2020, 2:55 PM.  [facility], [facility], [facility] HAKAN 2D+3D [facility] ([id number]), 6/06/2021, 7:58 AM.  [facility], [facility], [facility] HAKAN 2D+3D [facility] ([id number]), 6/11/2022, 7:32 AM.  [facility], [facility] HAKAN 2D+3D DIAGNOSTIC ADDL VWS  RIGHT ([id number]), 6/28/2022, 6:57 AM.     INDICATIONS: Z12.31 Screening mammogram for breast cancer     TECHNIQUE: Full field direct screening mammography was performed and images were reviewed with the [employer] PAL [de-identified] CAD device. 3D tomosynthesis was performed and reviewed        BREAST COMPOSITION:   Category c-Heterogeneously dense, which may obscure small masses. FINDINGS: The parenchymal pattern in both breasts is not significantly changed. No suspicious mass, calcification or distortion is seen in either breast.                  Impression   CONCLUSION:   No mammographic evidence of malignancy. BI-RADS CATEGORY:    DIAGNOSTIC CATEGORY 1--NEGATIVE ASSESSMENT. RECOMMENDATIONS:  ROUTINE MAMMOGRAM AND CLINICAL EVALUATION IN 12 MONTHS. PLEASE NOTE: NORMAL MAMMOGRAM DOES NOT EXCLUDE THE POSSIBILITY OF BREAST CANCER. A CLINICALLY SUSPICIOUS PALPABLE LUMP SHOULD BE BIOPSIED. For patients over the age of 36, the target due date for the patient's next mammogram has been entered into a reminder system.        Patient received a discharge summary from the technologist after completion of exam.     Breast marker legend used on images    Triangle = Palpable lump  Arco = Skin tag or mole  BB = Nipple  Linear janee = Scar  Square = Pain           Dictated by (CST): Tiffanie Reyes MD on 6/29/2023 at 12:47 PM      Finalized by (CST): Jr Gomez MD on 6/29/2023 at 12:49 PM           Collected 12/5/2022 10:10 AM       Status: Final result       Dx: Adult general medical exam    1 Result Note       1 Patient Communication      Component  Ref Range & Units 12/5/22 10:10 AM   TSH  0.358 - 3.740 mIU/mL 1.250   Comment: This test may exhibit interference when a sample is collected from a person who is consuming high dose of biotin (a.k.a., vitamin B7, vitamin H, coenzyme R) supplements resulting in serum concentrations >100 ng/mL. Intake of the recommended daily allowance (RDA) for biotin (0.03 mg) has not been shown to typically cause significant interference; however, high dose daily dietary supplements may contain biotin concentrations greater than 150 times (5-10 mg) the RDA. It is recommended that physicians ask all patients who may be on biotin supplementation to stop biotin consumption at least 72 hours prior to collection of a new sample. Resulting [facility])              Specimen Collected: 12/05/22 10:10 AM Last Resulted: 12/05/22  3:16 PM         0 Result Notes       1 Patient Communication      Component  Ref Range & Units 12/5/22 10:10 AM   WBC  4.0 - 11.0 x10(3) uL 9.1   RBC  3.80 - 5.30 x10(6)uL 4.52   HGB  12.0 - 16.0 g/dL 13.2   HCT  35.0 - 48.0 % 40.1   MCV  80.0 - 100.0 fL 88.7   MCH  26.0 - 34.0 pg 29.2   MCHC  31.0 - 37.0 g/dL 32.9   RDW  11.0 - 15.0 % 12.8   RDW-SD  35.1 - 46.3 fL 41.7   PLT  150.0 - 450.0 10(3)uL 282.0   Resulting [facility])              Specimen Collected: 12/05/22 10:10 AM Last Resulted: 12/05/2           ASSESSMENT AND PLAN:   Jigar Matthew is a 46year old female who presents for a hx of telephone visit with a hx of hot flushes all times of the day, worse at night, per pt. Not sleeping well. Pt on lovastatin for hypercholesterolimia,  hx of htn, hx of migraines without aura usually tx with excedrin migraine and caffeine.    Pt on multivitamins. Pt counseled on possible use of effexor at lowest dose. Pt has hx of anxiety, pt thinks that it may help with her anxiety. Pt counseled and reviewed chart. Rx for effexor 37.5 mg XR sent, pt to f/u 4 weeks.

## 2023-08-23 ENCOUNTER — NURSE TRIAGE (OUTPATIENT)
Dept: INTERNAL MEDICINE CLINIC | Facility: CLINIC | Age: 53
End: 2023-08-23

## 2023-08-23 NOTE — TELEPHONE ENCOUNTER
Please reply to pool: EM RN TRIAGE  Action Requested: Summary for Provider     []  Critical Lab, Recommendations Needed  [] Need Additional Advice  [x]   FYI    []   Need Orders  [] Need Medications Sent to Pharmacy  []  Other     SUMMARY: Patient contacts clinic reporting cough and sore throat. She is currently in [facility]. Symptoms began 4 days ago, blood tinged sputum. Sinus congestion. Denies fever, body aches or chills. Denies chest pain, shortness of breath or abdominal symptoms. She has not checked herself for covid. Acute visit booked 8/24, patient advised to hydrate [place].       Reason for call: Acute  Onset: Data Unavailable                       Reason for Disposition   Patient wants to be seen    Protocols used: Cough-A-OH

## 2023-08-24 ENCOUNTER — OFFICE VISIT (OUTPATIENT)
Dept: INTERNAL MEDICINE CLINIC | Facility: CLINIC | Age: 53
End: 2023-08-24

## 2023-08-24 VITALS
BODY MASS INDEX: 31.41 KG/M2 | HEIGHT: 60 IN | WEIGHT: 160 LBS | HEART RATE: 71 BPM | TEMPERATURE: 98 F | DIASTOLIC BLOOD PRESSURE: 81 MMHG | SYSTOLIC BLOOD PRESSURE: 122 MMHG

## 2023-08-24 DIAGNOSIS — J02.9 SORE THROAT: Primary | ICD-10-CM

## 2023-08-24 DIAGNOSIS — J01.00 ACUTE NON-RECURRENT MAXILLARY SINUSITIS: ICD-10-CM

## 2023-08-24 LAB
CONTROL LINE PRESENT WITH A CLEAR BACKGROUND (YES/NO): YES YES/NO
COVID19 BINAX NOW ANTIGEN: NOT DETECTED
KIT LOT #: 6668 NUMERIC
OPERATOR ID: NORMAL
POCT LOT NUMBER: NORMAL
STREP GRP A CUL-SCR: NEGATIVE

## 2023-08-24 PROCEDURE — 3079F DIAST BP 80-89 MM HG: CPT | Performed by: NURSE PRACTITIONER

## 2023-08-24 PROCEDURE — 99213 OFFICE O/P EST LOW 20 MIN: CPT | Performed by: NURSE PRACTITIONER

## 2023-08-24 PROCEDURE — 87880 STREP A ASSAY W/OPTIC: CPT | Performed by: NURSE PRACTITIONER

## 2023-08-24 PROCEDURE — 3074F SYST BP LT 130 MM HG: CPT | Performed by: NURSE PRACTITIONER

## 2023-08-24 PROCEDURE — 3008F BODY MASS INDEX DOCD: CPT | Performed by: NURSE PRACTITIONER

## 2023-08-24 RX ORDER — AZITHROMYCIN 250 MG/1
TABLET, FILM COATED ORAL
Qty: 6 TABLET | Refills: 0 | Status: SHIPPED | OUTPATIENT
Start: 2023-08-24 | End: 2023-08-29

## 2023-08-24 RX ORDER — BENZONATATE 100 MG/1
100 CAPSULE ORAL 3 TIMES DAILY PRN
Qty: 20 CAPSULE | Refills: 0 | Status: SHIPPED | OUTPATIENT
Start: 2023-08-24 | End: 2023-08-31

## 2023-08-24 NOTE — PATIENT INSTRUCTIONS
Plan  Hydrate with fluids  Tylenol two tabs every six hours. . Not to exceed 4 grams in one day. Drink Hot tea with lemon  Drink Hot tea with honey  Gargle with warm salt water if you have a sore throat. Benzonatate 100 mg po three times per day as needed for cough.   Jose Akers

## 2023-08-24 NOTE — ASSESSMENT & PLAN NOTE
Acute sinusitis with pharyngitis . Pharynx with erythema and exudate  Plan  Hydrate with fluids  Tylenol two tabs every six hours. . Not to exceed 4 grams in one day. Drink Hot tea with lemon  Drink Hot tea with honey  Gargle with warm salt water if you have a sore throat. Benzonatate 100 mg po three times per day as needed for cough.   Tian Olson

## 2023-08-28 RX ORDER — VENLAFAXINE HYDROCHLORIDE 37.5 MG/1
75 CAPSULE, EXTENDED RELEASE ORAL DAILY
Qty: 60 CAPSULE | Refills: 1 | Status: SHIPPED | OUTPATIENT
Start: 2023-08-28

## 2023-11-08 ENCOUNTER — HOSPITAL ENCOUNTER (OUTPATIENT)
Age: 53
Discharge: HOME | End: 2023-11-08
Attending: EMERGENCY MEDICINE
Payer: COMMERCIAL

## 2023-11-08 VITALS
OXYGEN SATURATION: 100 % | SYSTOLIC BLOOD PRESSURE: 134 MMHG | TEMPERATURE: 98 F | RESPIRATION RATE: 20 BRPM | DIASTOLIC BLOOD PRESSURE: 67 MMHG | HEART RATE: 69 BPM

## 2023-11-08 DIAGNOSIS — B97.89 VIRAL SINUSITIS: Primary | ICD-10-CM

## 2023-11-08 DIAGNOSIS — J32.9 VIRAL SINUSITIS: Primary | ICD-10-CM

## 2023-11-08 LAB — SARS-COV-2 RNA RESP QL NAA+PROBE: NOT DETECTED

## 2023-11-08 PROCEDURE — 99213 OFFICE O/P EST LOW 20 MIN: CPT

## 2023-11-08 PROCEDURE — 99212 OFFICE O/P EST SF 10 MIN: CPT

## 2023-11-08 NOTE — ED INITIAL ASSESSMENT (HPI)
Patient states her eyes hurt over the last few days. Also with post nasal drip and cough. States symptoms started on Monday.

## 2023-12-11 ENCOUNTER — OFFICE VISIT (OUTPATIENT)
Dept: INTERNAL MEDICINE CLINIC | Facility: CLINIC | Age: 53
End: 2023-12-11
Payer: COMMERCIAL

## 2023-12-11 VITALS
DIASTOLIC BLOOD PRESSURE: 79 MMHG | WEIGHT: 160 LBS | HEART RATE: 65 BPM | BODY MASS INDEX: 31.41 KG/M2 | HEIGHT: 60 IN | SYSTOLIC BLOOD PRESSURE: 126 MMHG

## 2023-12-11 DIAGNOSIS — R51.9 HEADACHE DISORDER: ICD-10-CM

## 2023-12-11 DIAGNOSIS — I10 PRIMARY HYPERTENSION: ICD-10-CM

## 2023-12-11 DIAGNOSIS — E78.00 HYPERCHOLESTEROLEMIA: ICD-10-CM

## 2023-12-11 DIAGNOSIS — N39.3 STRESS INCONTINENCE IN FEMALE: ICD-10-CM

## 2023-12-11 DIAGNOSIS — Z13.21 ENCOUNTER FOR VITAMIN DEFICIENCY SCREENING: ICD-10-CM

## 2023-12-11 DIAGNOSIS — Z13.0 SCREENING FOR DEFICIENCY ANEMIA: ICD-10-CM

## 2023-12-11 DIAGNOSIS — Z00.00 WELLNESS EXAMINATION: Primary | ICD-10-CM

## 2023-12-11 PROCEDURE — 90686 IIV4 VACC NO PRSV 0.5 ML IM: CPT | Performed by: NURSE PRACTITIONER

## 2023-12-11 PROCEDURE — 3078F DIAST BP <80 MM HG: CPT | Performed by: NURSE PRACTITIONER

## 2023-12-11 PROCEDURE — 3074F SYST BP LT 130 MM HG: CPT | Performed by: NURSE PRACTITIONER

## 2023-12-11 PROCEDURE — 90471 IMMUNIZATION ADMIN: CPT | Performed by: NURSE PRACTITIONER

## 2023-12-11 PROCEDURE — 3008F BODY MASS INDEX DOCD: CPT | Performed by: NURSE PRACTITIONER

## 2023-12-11 PROCEDURE — 99213 OFFICE O/P EST LOW 20 MIN: CPT | Performed by: NURSE PRACTITIONER

## 2023-12-11 PROCEDURE — 99396 PREV VISIT EST AGE 40-64: CPT | Performed by: NURSE PRACTITIONER

## 2023-12-11 RX ORDER — RIZATRIPTAN BENZOATE 10 MG/1
10 TABLET ORAL DAILY PRN
Qty: 9 TABLET | Refills: 1 | Status: SHIPPED | OUTPATIENT
Start: 2023-12-11 | End: 2024-01-10

## 2023-12-11 RX ORDER — HYDROCHLOROTHIAZIDE 12.5 MG/1
12.5 TABLET ORAL DAILY
Qty: 90 TABLET | Refills: 3 | Status: SHIPPED | OUTPATIENT
Start: 2023-12-11

## 2023-12-11 RX ORDER — LOVASTATIN 20 MG/1
TABLET ORAL
Qty: 90 TABLET | Refills: 3 | Status: SHIPPED | OUTPATIENT
Start: 2023-12-11

## 2023-12-20 ENCOUNTER — NURSE TRIAGE (OUTPATIENT)
Dept: INTERNAL MEDICINE CLINIC | Facility: CLINIC | Age: 53
End: 2023-12-20

## 2023-12-20 NOTE — TELEPHONE ENCOUNTER
Action Requested: Summary for Provider     []  Critical Lab, Recommendations Needed  [] Need Additional Advice  []   FYI    []   Need Orders  [] Need Medications Sent to Pharmacy  []  Other     SUMMARY: appointment scheduled for tomorrow    Reason for call: Leg Pain  Onset:      Patient calling (identified name and ) states she had hurt her right leg at work when stretching on  and the pain has been intermittent, sometimes worse at night. Pain is from right hip down to the ankle. Taking Advil and Aleve with occasional relief. Taking warm baths and using heat patches. Denies swelling, redness or warmness to the area. Requesting something for the pain. Appointment advised for an evaluation.      Reason for Disposition   MILD pain (e.g., does not interfere with normal activities) and present > 7 days    Protocols used: Leg Pain-A-OH      Future Appointments   Date Time Provider John Bazzi   2023  4:00 PM Dulce Alvarez MD [facility]   1/15/2024  8:00 AM Ricky Rodrigues MD [facility]   2024  3:00 PM Haylee Thurston MD [facility] EMANUEL AUGUSTIN [facility] [place]

## 2023-12-21 ENCOUNTER — LAB ENCOUNTER (OUTPATIENT)
Dept: LAB | Age: 53
End: 2023-12-21
Attending: NURSE PRACTITIONER
Payer: COMMERCIAL

## 2023-12-21 DIAGNOSIS — Z13.0 SCREENING FOR DEFICIENCY ANEMIA: ICD-10-CM

## 2023-12-21 DIAGNOSIS — I10 PRIMARY HYPERTENSION: ICD-10-CM

## 2023-12-21 DIAGNOSIS — Z13.21 ENCOUNTER FOR VITAMIN DEFICIENCY SCREENING: ICD-10-CM

## 2023-12-21 DIAGNOSIS — Z00.00 WELLNESS EXAMINATION: ICD-10-CM

## 2023-12-21 DIAGNOSIS — E78.00 HYPERCHOLESTEROLEMIA: ICD-10-CM

## 2023-12-21 LAB
ALBUMIN SERPL-MCNC: 4.4 G/DL (ref 3.2–4.8)
ALBUMIN/GLOB SERPL: 1.3 {RATIO} (ref 1–2)
ALP LIVER SERPL-CCNC: 76 U/L
ALT SERPL-CCNC: 18 U/L
ANION GAP SERPL CALC-SCNC: 5 MMOL/L (ref 0–18)
AST SERPL-CCNC: 27 U/L (ref ?–34)
BASOPHILS # BLD AUTO: 0.06 X10(3) UL (ref 0–0.2)
BASOPHILS NFR BLD AUTO: 0.7 %
BILIRUB SERPL-MCNC: 0.4 MG/DL (ref 0.3–1.2)
BUN BLD-MCNC: 12 MG/DL (ref 9–23)
BUN/CREAT SERPL: 11.3 (ref 10–20)
CALCIUM BLD-MCNC: 10.1 MG/DL (ref 8.7–10.4)
CHLORIDE SERPL-SCNC: 106 MMOL/L (ref 98–112)
CHOLEST SERPL-MCNC: 171 MG/DL (ref ?–200)
CO2 SERPL-SCNC: 31 MMOL/L (ref 21–32)
CREAT BLD-MCNC: 1.06 MG/DL
DEPRECATED RDW RBC AUTO: 38.1 FL (ref 35.1–46.3)
EGFRCR SERPLBLD CKD-EPI 2021: 62 ML/MIN/1.73M2 (ref 60–?)
EOSINOPHIL # BLD AUTO: 0.35 X10(3) UL (ref 0–0.7)
EOSINOPHIL NFR BLD AUTO: 4.2 %
ERYTHROCYTE [DISTWIDTH] IN BLOOD BY AUTOMATED COUNT: 12.2 % (ref 11–15)
FASTING PATIENT LIPID ANSWER: YES
FASTING STATUS PATIENT QL REPORTED: YES
GLOBULIN PLAS-MCNC: 3.4 G/DL (ref 2.8–4.4)
GLUCOSE BLD-MCNC: 82 MG/DL (ref 70–99)
HCT VFR BLD AUTO: 40 %
HDLC SERPL-MCNC: 70 MG/DL (ref 40–59)
HGB BLD-MCNC: 12.7 G/DL
IMM GRANULOCYTES # BLD AUTO: 0.01 X10(3) UL (ref 0–1)
IMM GRANULOCYTES NFR BLD: 0.1 %
LDLC SERPL CALC-MCNC: 87 MG/DL (ref ?–100)
LYMPHOCYTES # BLD AUTO: 3.58 X10(3) UL (ref 1–4)
LYMPHOCYTES NFR BLD AUTO: 42.9 %
MCH RBC QN AUTO: 27.3 PG (ref 26–34)
MCHC RBC AUTO-ENTMCNC: 31.8 G/DL (ref 31–37)
MCV RBC AUTO: 85.8 FL
MONOCYTES # BLD AUTO: 0.56 X10(3) UL (ref 0.1–1)
MONOCYTES NFR BLD AUTO: 6.7 %
NEUTROPHILS # BLD AUTO: 3.78 X10 (3) UL (ref 1.5–7.7)
NEUTROPHILS # BLD AUTO: 3.78 X10(3) UL (ref 1.5–7.7)
NEUTROPHILS NFR BLD AUTO: 45.4 %
NONHDLC SERPL-MCNC: 101 MG/DL (ref ?–130)
OSMOLALITY SERPL CALC.SUM OF ELEC: 293 MOSM/KG (ref 275–295)
PLATELET # BLD AUTO: 326 10(3)UL (ref 150–450)
POTASSIUM SERPL-SCNC: 3.4 MMOL/L (ref 3.5–5.1)
PROT SERPL-MCNC: 7.8 G/DL (ref 5.7–8.2)
RBC # BLD AUTO: 4.66 X10(6)UL
SODIUM SERPL-SCNC: 142 MMOL/L (ref 136–145)
TRIGL SERPL-MCNC: 72 MG/DL (ref 30–149)
TSI SER-ACNC: 1.28 MIU/ML (ref 0.55–4.78)
VIT D+METAB SERPL-MCNC: 47.6 NG/ML (ref 30–100)
VLDLC SERPL CALC-MCNC: 11 MG/DL (ref 0–30)
WBC # BLD AUTO: 8.3 X10(3) UL (ref 4–11)

## 2023-12-21 PROCEDURE — 36415 COLL VENOUS BLD VENIPUNCTURE: CPT

## 2023-12-21 PROCEDURE — 80053 COMPREHEN METABOLIC PANEL: CPT

## 2023-12-21 PROCEDURE — 85025 COMPLETE CBC W/AUTO DIFF WBC: CPT

## 2023-12-21 PROCEDURE — 80061 LIPID PANEL: CPT

## 2023-12-21 PROCEDURE — 84443 ASSAY THYROID STIM HORMONE: CPT

## 2023-12-21 PROCEDURE — 82306 VITAMIN D 25 HYDROXY: CPT

## 2024-01-15 ENCOUNTER — OFFICE VISIT (OUTPATIENT)
Dept: SURGERY | Facility: CLINIC | Age: 54
End: 2024-01-15
Payer: COMMERCIAL

## 2024-01-15 VITALS — DIASTOLIC BLOOD PRESSURE: 75 MMHG | SYSTOLIC BLOOD PRESSURE: 118 MMHG | HEART RATE: 66 BPM

## 2024-01-15 DIAGNOSIS — N39.3 FEMALE STRESS INCONTINENCE: ICD-10-CM

## 2024-01-15 DIAGNOSIS — N39.46 MIXED STRESS AND URGE URINARY INCONTINENCE: Primary | ICD-10-CM

## 2024-01-15 NOTE — PROGRESS NOTES
[facility] Urology  Initial Office Consultation    HPI:   Paty Ibrahim is a 53 year old female here today for consultation at the request of, and a copy of this note will be sent to, Mark Caballero MD.    1. Female Stress / Urge Incontinence  Patient presents for further evaluation management of urinary incontinence.    Problem has been going on for 2 to 3 years per patient.  She reports stress urinary incontinence with coughing and sneezing (a few drops).  She also reports rare urge type urinary incontinence.  She wears 1 panty liner per day.    She denies smoking or chronic coughing.  She has 1 child via normal vaginal delivery.    She had a robotic laparoscopic hysterectomy in 2020 for dysfunctional uterine bleeding.    No recurrent UTIs or gross hematuria.  No nephrolithiasis.    Sexually active with 1 male partner.  No dyspareunia or unusual vaginal discharge.      PAST MEDICAL HISTORY: HLD. Migraines. HTN.    PAST SURGICAL HISTORY: robotic Hysterectomy with b/l salpingectomy 12/2020.    SOCIAL HISTORY: Single.  1 child.  No smoking or illicit drug use.  Rare social alcohol.  Works as a bank .     Family History   Problem Relation Age of Onset    Cancer Father         Throat. Cause of death.     Psychiatric Father         alcoholic    Heart Disease Maternal Grandmother 85        Cause of death    Psychiatric Cousin         bipolar     Allergies: Patient has no known allergies.      REVIEW OF SYSTEMS:  Pertinent positives and negatives per HPI. A 12-point ROS was performed and is otherwise negative.       EXAM:  LMP 05/04/2020 (LMP Unknown)     Physical Exam  Vitals reviewed. Exam conducted with a chaperone present.   Constitutional:       General: She is not in acute distress.     Appearance: She is well-developed.   HENT:      Head: Atraumatic.   Eyes:      Conjunctiva/sclera: Conjunctivae normal.   Cardiovascular:      Rate and Rhythm: Normal rate.   Pulmonary:       Effort: Pulmonary effort is normal.   Abdominal:      Palpations: Abdomen is soft. There is no mass.   Genitourinary:     General: Normal vulva.      Exam position: Lithotomy position.      Erasmo stage (genital): 5.      Urethra: No prolapse, urethral pain, urethral swelling or urethral lesion.      Vagina: No vaginal discharge, tenderness, lesions or prolapsed vaginal walls.      Cervix: No discharge.      Uterus: Absent.       Adnexa:         Right: No tenderness.          Left: No tenderness.        Comments: No evidence of stress incontinence with coughing, although patient just voided before exam.   Musculoskeletal:         General: Normal range of motion.      Cervical back: Normal range of motion.   Skin:     General: Skin is warm and dry.   Neurological:      Mental Status: She is alert and oriented to person, place, and time.   Psychiatric:         Behavior: Behavior normal.       LABS:  No results found.       IMAGING:   No results found.      IMPRESSION:  53 year old female with mixed urge and stress urinary incontinence.    I had a detailed discussion with the patient regarding stress urinary incontinence.  We went over the known causes and risk factors including female gender, multiparity, obesity, smoking and chronic coughing.  We discussed the relevant female pelvic floor anatomy and physiology.    We discussed the signs and symptoms of stress urinary incontinence as well as the common incidence. We also discussed evaluation and the treatment algorithm. We discussed that initial management includes lifestyle modifications and products including exercise and weight loss, smoking cessation, the use of pads and pessaries.  We also discussed pelvic floor strengthening exercises (Kegel exercises) and the role that they play in treating stress urinary incontinence.     We also discussed surgical management options which are more invasive and include bulking agents, and incontinence procedures such as  synthetic and autologous slings and bladder neck suspension.    I recommend Pelvic floor muscle (Kegel) exercises in addition to an attempt at weight loss as a first line management option. She could try that approach after which we will see her and follow-up on her condition. Failure of lifestyle modifications and pelvic floor strengthening exercises will warrant further discussion regarding her surgical treatment options.    Regarding urge type urinary incontinence, we discussed management options including behavioral changes as well as medical therapy such as such as antimuscarinics or beta 3 agonists.  Benefits, risks, side effects, alternatives of treatment were discussed.    Patient agreeable to a trial of beta 3 agonists.    All her questions were answered.      PLAN:  1.  Pelvic floor physical therapy referral.  2.  Trial of mirabegron 50 mg daily for urge incontinence.      Abel Lanza MD  1/15/2024

## 2024-01-29 RX ORDER — ACETAMINOPHEN AND CODEINE PHOSPHATE 120; 12 MG/5ML; MG/5ML
0.35 SOLUTION ORAL DAILY
Qty: 84 TABLET | Refills: 0 | Status: SHIPPED | OUTPATIENT
Start: 2024-01-29

## 2024-02-13 ENCOUNTER — OFFICE VISIT (OUTPATIENT)
Dept: INTERNAL MEDICINE CLINIC | Facility: CLINIC | Age: 54
End: 2024-02-13
Payer: COMMERCIAL

## 2024-02-13 VITALS
SYSTOLIC BLOOD PRESSURE: 124 MMHG | DIASTOLIC BLOOD PRESSURE: 77 MMHG | BODY MASS INDEX: 32 KG/M2 | HEIGHT: 60 IN | HEART RATE: 60 BPM | WEIGHT: 163 LBS

## 2024-02-13 DIAGNOSIS — N76.0 ACUTE VAGINITIS: Primary | ICD-10-CM

## 2024-02-13 LAB
APPEARANCE: CLEAR
BILIRUB UR QL: NEGATIVE
BILIRUBIN: NEGATIVE
CLARITY UR: CLEAR
GLUCOSE (URINE DIPSTICK): NEGATIVE MG/DL
GLUCOSE UR-MCNC: NORMAL MG/DL
HGB UR QL STRIP.AUTO: NEGATIVE
KETONES (URINE DIPSTICK): NEGATIVE MG/DL
KETONES UR-MCNC: NEGATIVE MG/DL
LEUKOCYTE ESTERASE UR QL STRIP.AUTO: 75
MULTISTIX EXPIRATION DATE: ABNORMAL DATE
MULTISTIX LOT#: ABNORMAL NUMERIC
NITRITE UR QL STRIP.AUTO: NEGATIVE
NITRITE, URINE: NEGATIVE
OCCULT BLOOD: NEGATIVE
PH UR: 7 [PH] (ref 5–8)
PH, URINE: 7 (ref 4.5–8)
PROT UR-MCNC: NEGATIVE MG/DL
PROTEIN (URINE DIPSTICK): NEGATIVE MG/DL
SP GR UR STRIP: 1.02 (ref 1–1.03)
SPECIFIC GRAVITY: 1.02 (ref 1–1.03)
URINE-COLOR: YELLOW
UROBILINOGEN UR STRIP-ACNC: NORMAL
UROBILINOGEN,SEMI-QN: 0.2 MG/DL (ref 0–1.9)

## 2024-02-13 PROCEDURE — 3008F BODY MASS INDEX DOCD: CPT | Performed by: NURSE PRACTITIONER

## 2024-02-13 PROCEDURE — 99214 OFFICE O/P EST MOD 30 MIN: CPT | Performed by: NURSE PRACTITIONER

## 2024-02-13 PROCEDURE — 3074F SYST BP LT 130 MM HG: CPT | Performed by: NURSE PRACTITIONER

## 2024-02-13 PROCEDURE — 99459 PELVIC EXAMINATION: CPT | Performed by: NURSE PRACTITIONER

## 2024-02-13 PROCEDURE — 3078F DIAST BP <80 MM HG: CPT | Performed by: NURSE PRACTITIONER

## 2024-02-13 PROCEDURE — 81003 URINALYSIS AUTO W/O SCOPE: CPT | Performed by: NURSE PRACTITIONER

## 2024-02-13 RX ORDER — RIZATRIPTAN BENZOATE 10 MG/1
TABLET ORAL
COMMUNITY
Start: 2024-01-11

## 2024-02-13 NOTE — PROGRESS NOTES
Paty Ibrahim is a 53 year old female.  HPI:   Pt c/o vaginal irritation for a couple of weeks. Denies any odor, discharge. Uses \"Honey Pot\" suppository occasional which contains tea tree oil and boric acid. Also uses Vagisil external wash, denies douching. Sexually active, no new partners. She reports the irritations is a very mild intermittent itch. Denies any vaginal bleeding, hysterectomy in  due to bleeding/fibroid in  .   Current Outpatient Medications   Medication Sig Dispense Refill    Rizatriptan Benzoate 10 MG Oral Tab       clotrimazole 2 % Vaginal Cream Apply one applicatorful nightly 21 g 0    Norethindrone 0.35 MG Oral Tab Take 1 tablet (0.35 mg total) by mouth daily. 84 tablet 0    Mirabegron ER 50 MG Oral Tablet 24 Hr Take 1 tablet (50 mg total) by mouth daily. 30 tablet 3    Lovastatin 20 MG Oral Tab TAKE 1 TABLET BY MOUTH EVERY DAY WITH THE EVENING MEAL 90 tablet 3    hydroCHLOROthiazide 12.5 MG Oral Tab Take 1 tablet (12.5 mg total) by mouth daily. 90 tablet 3    fluticasone propionate 50 MCG/ACT Nasal Suspension 1 spray by Nasal route 2 (two) times daily. 3 each 3    Probiotic Product (PROBIOTIC ACIDOPHILUS) Oral Chew Tab Chew by mouth.      Cholecalciferol (VITAMIN D) 50 MCG (2000 UT) Oral Cap Take by mouth.      Multiple Vitamins-Minerals (WOMENS MULTIVITAMIN PLUS) Oral Tab Take by mouth.        Past Medical History:   Diagnosis Date    Dehydration     Depression     High cholesterol     History of total hysterectomy     Migraines     Missed      Other and unspecified hyperlipidemia     Other ill-defined conditions(799.89)     \"ACS-US\". \"Endometrial curettings/Cervical biopsy\"    Pregnancy     Pregnancy 1997    Pregnancy 2008    Preoperative testing 2020    Primary hypertension 2022    Visual impairment       Social History:  Social History     Socioeconomic History    Marital status: Single   Tobacco Use    Smoking status: Never    Smokeless  tobacco: Never   Substance and Sexual Activity    Alcohol use: Not Currently     Alcohol/week: 0.0 standard drinks of alcohol     Comment: Socially, 1 drink a year.    Drug use: No    Sexual activity: Never   Other Topics Concern    Caffeine Concern Yes     Comment: Soda, coffee - 1 cup per day         REVIEW OF SYSTEMS:   Review of Systems   All other systems reviewed and are negative.         EXAM:   /77 (BP Location: Right arm, Patient Position: Sitting, Cuff Size: adult)   Pulse 60   Ht 5' (1.524 m)   Wt 163 lb (73.9 kg)   LMP 05/04/2020 (LMP Unknown)   BMI 31.83 kg/m²     Physical Exam  Vitals reviewed.   Constitutional:       General: She is not in acute distress.  Eyes:      Conjunctiva/sclera: Conjunctivae normal.      Pupils: Pupils are equal, round, and reactive to light.   Abdominal:      General: Abdomen is flat. Bowel sounds are normal. There is no distension.      Palpations: There is no mass.      Tenderness: There is no abdominal tenderness.   Genitourinary:     General: Normal vulva.      Vagina: No signs of injury. Erythema present. No vaginal discharge, tenderness, bleeding or lesions.      Comments: Declined chaperone  Skin:     General: Skin is warm.      Coloration: Skin is not jaundiced.   Neurological:      Mental Status: She is alert and oriented to person, place, and time.   Psychiatric:         Mood and Affect: Mood normal.         Judgment: Judgment normal.            ASSESSMENT AND PLAN:   1. Acute vaginitis  -minimal erythema, no discharge noted.   -RORY 2020 (fibroids)  -Vaginal culture obtained  -Advised to stop OTC vaginal products, continue to avoid douching, avoid scented soap products.   -Trial of clotrimazole cream.   -Keep scheduled appt with Gyne in 2 weeks.   - Vaginitis Vaginosis PCR Panel; Future  - Urinalysis, Routine [E]; Future  - Urine Culture, Routine [E]; Future  - clotrimazole 2 % Vaginal Cream; Apply one applicatorful nightly  Dispense: 21 g; Refill: 0  -  Vaginitis Vaginosis PCR Panel  - Urinalysis, Routine [E]  - Urine Culture, Routine [E]  - POC Urinalysis, Automated Dip without microscopy (PCA and EMMG ONLY) [[id number]]       The patient indicates understanding of these issues and agrees to the plan.  The patient is asked to return in PRN.     The above note was creating using Dragon speech recognition technology. Please excuse any typos.

## 2024-02-14 LAB
BV BACTERIA DNA VAG QL NAA+PROBE: NEGATIVE
C GLABRATA DNA VAG QL NAA+PROBE: NEGATIVE
C KRUSEI DNA VAG QL NAA+PROBE: NEGATIVE
CANDIDA DNA VAG QL NAA+PROBE: NEGATIVE
T VAGINALIS DNA VAG QL NAA+PROBE: NEGATIVE

## 2024-02-29 ENCOUNTER — OFFICE VISIT (OUTPATIENT)
Dept: OBGYN CLINIC | Facility: CLINIC | Age: 54
End: 2024-02-29
Payer: COMMERCIAL

## 2024-02-29 VITALS
SYSTOLIC BLOOD PRESSURE: 134 MMHG | DIASTOLIC BLOOD PRESSURE: 84 MMHG | HEIGHT: 60 IN | WEIGHT: 164.25 LBS | BODY MASS INDEX: 32.25 KG/M2 | HEART RATE: 56 BPM

## 2024-02-29 DIAGNOSIS — Z12.31 BREAST CANCER SCREENING BY MAMMOGRAM: ICD-10-CM

## 2024-02-29 DIAGNOSIS — Z12.4 ENCOUNTER FOR PAPANICOLAOU SMEAR FOR CERVICAL CANCER SCREENING: Primary | ICD-10-CM

## 2024-02-29 PROCEDURE — 3079F DIAST BP 80-89 MM HG: CPT | Performed by: OBSTETRICS & GYNECOLOGY

## 2024-02-29 PROCEDURE — 3008F BODY MASS INDEX DOCD: CPT | Performed by: OBSTETRICS & GYNECOLOGY

## 2024-02-29 PROCEDURE — 3075F SYST BP GE 130 - 139MM HG: CPT | Performed by: OBSTETRICS & GYNECOLOGY

## 2024-02-29 PROCEDURE — 99396 PREV VISIT EST AGE 40-64: CPT | Performed by: OBSTETRICS & GYNECOLOGY

## 2024-02-29 NOTE — PROGRESS NOTES
HPI:   Paty Ibrahim is a 53 year old female who presents for an annual exam.     Wt Readings from Last 6 Encounters:   24 164 lb 4 oz (74.5 kg)   24 163 lb (73.9 kg)   23 160 lb (72.6 kg)   23 160 lb (72.6 kg)   23 165 lb (74.8 kg)   23 163 lb (73.9 kg)     Body mass index is 32.08 kg/m².     Cholesterol, Total (mg/dL)   Date Value   2023 171   2022 143   2021 154     HDL Cholesterol (mg/dL)   Date Value   2023 70 (H)   2022 56   2021 65 (H)     LDL Cholesterol (mg/dL)   Date Value   2023 87   2022 71   2021 74     AST (U/L)   Date Value   2023 27   2022 16   2021 18     ALT (U/L)   Date Value   2023 18   2022 22   2021 18        Current Outpatient Medications   Medication Sig Dispense Refill    Rizatriptan Benzoate 10 MG Oral Tab       Norethindrone 0.35 MG Oral Tab Take 1 tablet (0.35 mg total) by mouth daily. 84 tablet 0    Mirabegron ER 50 MG Oral Tablet 24 Hr Take 1 tablet (50 mg total) by mouth daily. 30 tablet 3    Lovastatin 20 MG Oral Tab TAKE 1 TABLET BY MOUTH EVERY DAY WITH THE EVENING MEAL 90 tablet 3    hydroCHLOROthiazide 12.5 MG Oral Tab Take 1 tablet (12.5 mg total) by mouth daily. 90 tablet 3    fluticasone propionate 50 MCG/ACT Nasal Suspension 1 spray by Nasal route 2 (two) times daily. 3 each 3    Probiotic Product (PROBIOTIC ACIDOPHILUS) Oral Chew Tab Chew by mouth.      Cholecalciferol (VITAMIN D) 50 MCG (2000 UT) Oral Cap Take by mouth.      Multiple Vitamins-Minerals (WOMENS MULTIVITAMIN PLUS) Oral Tab Take by mouth.      clotrimazole 2 % Vaginal Cream Apply one applicatorful nightly (Patient not taking: Reported on 2024) 21 g 0      Past Medical History:   Diagnosis Date    Dehydration     Depression     High cholesterol     History of total hysterectomy     Migraines     Missed  (HCC)     Other and unspecified hyperlipidemia     Other  ill-defined conditions(799.89)     \"ACS-US\". \"Endometrial curettings/Cervical biopsy\"    Pregnancy (HCC)     Pregnancy (HCC)     Pregnancy (HCC)     Preoperative testing 2020    Primary hypertension 2022    Visual impairment       Past Surgical History:   Procedure Laterality Date    COLONOSCOPY      HYSTERECTOMY  2020    INDUCED  17-24 WEEKS            OTHER SURGICAL HISTORY      root cancel, with infection and crown replacement       Family History   Problem Relation Age of Onset    Cancer Father         Throat. Cause of death.     Psychiatric Father         alcoholic    Heart Disease Maternal Grandmother 85        Cause of death    Psychiatric Cousin         bipolar      Social History:   Social History     Socioeconomic History    Marital status: Single   Tobacco Use    Smoking status: Never    Smokeless tobacco: Never   Substance and Sexual Activity    Alcohol use: Not Currently     Alcohol/week: 0.0 standard drinks of alcohol     Comment: Socially, 1 drink a year.    Drug use: No    Sexual activity: Never   Other Topics Concern    Caffeine Concern Yes     Comment: Soda, coffee - 1 cup per day             REVIEW OF SYSTEMS:   GENERAL: feels well otherwise  SKIN: denies any unusual skin lesions  EYES:denies blurred vision or double vision  HEENT: denies nasal congestion, sinus pain or ST  LUNGS: denies shortness of breath with exertion  CARDIOVASCULAR: denies chest pain on exertion  GI: denies abdominal pain,denies heartburn  : denies dysuria, vaginal discharge or itching,periods regular   MUSCULOSKELETAL: denies back pain  NEURO: denies headaches  PSYCHE: denies depression or anxiety  HEMATOLOGIC: denies hx of anemia  ENDOCRINE: denies thyroid history  ALL/ASTHMA: denies hx of allergy or asthma    EXAM:   /84 (BP Location: Left arm, Patient Position: Sitting, Cuff Size: large)   Pulse 56   Ht 5' (1.524 m)   Wt 164 lb 4 oz (74.5 kg)   LMP  05/04/2020 (LMP Unknown)   BMI 32.08 kg/m²   Body mass index is 32.08 kg/m².   GENERAL: well developed, well nourished,in no apparent distress  SKIN: no rashes,no suspicious lesions  HEENT: atraumatic, normocephalic  EYES:normal in appearance  NECK: supple,no adenopathy  CHEST: no chest tenderness  BREAST: no dominant or suspicious mass  LUNGS: clear to auscultation  CARDIO: RRR without murmur  GI: good BS's,no masses, HSM or tenderness  :introitus is normal,scant discharge,cuff is pink,no adnexal masses or tenderness, PAP was done     MUSCULOSKELETAL: back is not tender,FROM of the back  EXTREMITIES: no cyanosis, clubbing or edema  NEURO: Oriented times three      ASSESSMENT AND PLAN:   Paty Ibrahim is a 53 year old female who presents for an annual exam.  . Self breast exam explained. Health maintenance. Body mass index is 32.08 kg/m²., recommended low fat diet and aerobic exercise 30 minutes three times weekly.  The patient indicates understanding of these issues and agrees to the plan.  The patient is asked to return for an annual visit.

## 2024-03-01 LAB — HPV I/H RISK 1 DNA SPEC QL NAA+PROBE: NEGATIVE

## 2024-03-25 ENCOUNTER — EKG ENCOUNTER (OUTPATIENT)
Dept: LAB | Age: 54
End: 2024-03-25
Attending: NURSE PRACTITIONER
Payer: COMMERCIAL

## 2024-03-25 ENCOUNTER — NURSE TRIAGE (OUTPATIENT)
Facility: CLINIC | Age: 54
End: 2024-03-25

## 2024-03-25 ENCOUNTER — HOSPITAL ENCOUNTER (OUTPATIENT)
Dept: GENERAL RADIOLOGY | Age: 54
Discharge: HOME | End: 2024-03-25
Attending: NURSE PRACTITIONER
Payer: COMMERCIAL

## 2024-03-25 ENCOUNTER — OFFICE VISIT (OUTPATIENT)
Dept: INTERNAL MEDICINE CLINIC | Facility: CLINIC | Age: 54
End: 2024-03-25
Payer: COMMERCIAL

## 2024-03-25 VITALS
HEART RATE: 61 BPM | WEIGHT: 164 LBS | BODY MASS INDEX: 32.2 KG/M2 | SYSTOLIC BLOOD PRESSURE: 120 MMHG | DIASTOLIC BLOOD PRESSURE: 77 MMHG | HEIGHT: 60 IN

## 2024-03-25 DIAGNOSIS — R07.89 CHEST PRESSURE: Primary | ICD-10-CM

## 2024-03-25 DIAGNOSIS — R07.89 CHEST PRESSURE: ICD-10-CM

## 2024-03-25 DIAGNOSIS — R20.0 BILATERAL HAND NUMBNESS: ICD-10-CM

## 2024-03-25 DIAGNOSIS — R20.2 PARESTHESIA OF LEFT ARM: ICD-10-CM

## 2024-03-25 PROCEDURE — 93010 ELECTROCARDIOGRAM REPORT: CPT | Performed by: STUDENT IN AN ORGANIZED HEALTH CARE EDUCATION/TRAINING PROGRAM

## 2024-03-25 PROCEDURE — 3074F SYST BP LT 130 MM HG: CPT | Performed by: NURSE PRACTITIONER

## 2024-03-25 PROCEDURE — 99214 OFFICE O/P EST MOD 30 MIN: CPT | Performed by: NURSE PRACTITIONER

## 2024-03-25 PROCEDURE — 3078F DIAST BP <80 MM HG: CPT | Performed by: NURSE PRACTITIONER

## 2024-03-25 PROCEDURE — 93005 ELECTROCARDIOGRAM TRACING: CPT

## 2024-03-25 PROCEDURE — 71046 X-RAY EXAM CHEST 2 VIEWS: CPT | Performed by: NURSE PRACTITIONER

## 2024-03-25 PROCEDURE — 3008F BODY MASS INDEX DOCD: CPT | Performed by: NURSE PRACTITIONER

## 2024-03-25 NOTE — PROGRESS NOTES
Paty Ibrahim is a 53 year old female.  HPI:   Pt reports left arm tingling across upper lateral arm. She reports bilateral numbness in fingers, sometimes the fingers turn white. Reports possible frostbite in childhood and since then has had this happen randomly but year round regardless of weather. Denies hx of carpal tunnel. Hands/wrists are not numb at night.   Reports chest pressure, middle of chest.   Eating does not make pain worse, pain comes and goes, pain 1-2/10, Denies any injury.   Denies any left sided CP, SOB, HA, dizziness, vision changes, palpitations, NVDC, abdominal pain, appetite or weight changes, fatigue.   Hx of abnormal EKG 7/26/2022: \"Low voltage in precordial leads.    -RSR(V1).    -Old inferior infarct -Old anterior infarct.    - Nonspecific T-abnormality.   ABNORMAL \"    Pt completes cardiac nuclear stress test 8/03/2022:  EF 71% \"Normal regadenoson (Lexiscan) myocardial perfusion study with normal wall motion and left ventricular ejection fraction. \"  Current Outpatient Medications   Medication Sig Dispense Refill    Rizatriptan Benzoate 10 MG Oral Tab       Lovastatin 20 MG Oral Tab TAKE 1 TABLET BY MOUTH EVERY DAY WITH THE EVENING MEAL 90 tablet 3    hydroCHLOROthiazide 12.5 MG Oral Tab Take 1 tablet (12.5 mg total) by mouth daily. 90 tablet 3    fluticasone propionate 50 MCG/ACT Nasal Suspension 1 spray by Nasal route 2 (two) times daily. 3 each 3    Probiotic Product (PROBIOTIC ACIDOPHILUS) Oral Chew Tab Chew by mouth.      Cholecalciferol (VITAMIN D) 50 MCG (2000 UT) Oral Cap Take by mouth.      Multiple Vitamins-Minerals (WOMENS MULTIVITAMIN PLUS) Oral Tab Take by mouth.      clotrimazole 2 % Vaginal Cream Apply one applicatorful nightly (Patient not taking: Reported on 2/29/2024) 21 g 0    Norethindrone 0.35 MG Oral Tab Take 1 tablet (0.35 mg total) by mouth daily. 84 tablet 0    Mirabegron ER 50 MG Oral Tablet 24 Hr Take 1 tablet (50 mg total) by mouth daily. 30 tablet 3       Past Medical History:   Diagnosis Date    Dehydration 1991    Depression     High cholesterol     History of total hysterectomy     Migraines     Missed  (HCC)     Other and unspecified hyperlipidemia     Other ill-defined conditions(799.89)     \"ACS-US\". \"Endometrial curettings/Cervical biopsy\"    Pregnancy (HCC)     Pregnancy (HCC)     [facility] ([facility])     Preoperative testing 2020    Primary hypertension 2022    Visual impairment       Social History:  Social History     Socioeconomic History    Marital status: Single   Tobacco Use    Smoking status: Never    Smokeless tobacco: Never   Substance and Sexual Activity    Alcohol use: Not Currently     Alcohol/week: 0.0 standard drinks of alcohol     Comment: Socially, 1 drink a year.    Drug use: No    Sexual activity: Never   Other Topics Concern    Caffeine Concern Yes     Comment: Soda, coffee - 1 cup per day         REVIEW OF SYSTEMS:   Review of Systems   All other systems reviewed and are negative.         EXAM:   /77 (BP Location: Right arm, Patient Position: Sitting, Cuff Size: large)   Pulse 61   Ht 5' (1.524 m)   Wt 164 lb (74.4 kg)   LMP 2020 (LMP Unknown)   BMI 32.03 kg/m²     Physical Exam  Vitals reviewed.   Constitutional:       General: She is not in acute distress.     Appearance: Normal appearance. She is normal weight. She is not ill-appearing.   HENT:      Head: Normocephalic.      Mouth/Throat:      Pharynx: Oropharynx is clear.   Eyes:      General: No scleral icterus.        Right eye: No discharge.         Left eye: No discharge.      Extraocular Movements: Extraocular movements intact.      Conjunctiva/sclera: Conjunctivae normal.      Pupils: Pupils are equal, round, and reactive to light.   Neck:      Thyroid: No thyroid mass or thyromegaly.   Cardiovascular:      Rate and Rhythm: Normal rate and regular rhythm.      Pulses: Normal pulses.      Heart sounds: Normal heart sounds. No  murmur heard.  Pulmonary:      Effort: Pulmonary effort is normal. No respiratory distress.      Breath sounds: Normal breath sounds.   Chest:      Chest wall: Tenderness present. No mass or swelling.          Comments: Tenderness to palpation  Abdominal:      General: Abdomen is flat. There is no distension.      Palpations: Abdomen is soft. There is no mass.      Tenderness: There is no abdominal tenderness.   Musculoskeletal:         General: Normal range of motion.      Cervical back: Normal range of motion and neck supple.      Right lower leg: No edema.      Left lower leg: No edema.   Lymphadenopathy:      Cervical: No cervical adenopathy.   Skin:     General: Skin is warm and dry.      Coloration: Skin is not jaundiced.   Neurological:      General: No focal deficit present.      Mental Status: She is alert and oriented to person, place, and time.      Cranial Nerves: No cranial nerve deficit.      Sensory: No sensory deficit.      Motor: Motor function is intact. No weakness.      Gait: Gait normal.      Comments: Negative phalen   Psychiatric:         Mood and Affect: Mood normal.         Judgment: Judgment normal.            ASSESSMENT AND PLAN:   1. Chest pressure  -will proceed with CXR and EKG. Hx of abnormal EKG. Sx not related to food/eating, no reflux reported. Pain reproducible on exam.   -Pending results, consider pain MSK in origin, would benefit from PT  -Reviewed alarm signs/when to go to ER.   - EKG 12 Lead to be performed at [facility]; Future  - XR CHEST PA + LAT CHEST (CPT=71046); Future    2. Bilateral hand numbness  -Suspect CTS, referral to physiatry for evaluation. Discussed use of wrist brace.   - PHYSIATRY - INTERNAL    3. Paresthesia of left arm  -EKG ordered today  -Reviewed concerning s/s.  -Referral to physiatry. Would benefit from EMG/NCS  - PHYSIATRY - INTERNAL       The patient indicates understanding of these issues and agrees to the plan.  The patient is asked  to return in 1-2 weeks.     The above note was creating using Dragon speech recognition technology. Please excuse any typos.

## 2024-03-25 NOTE — TELEPHONE ENCOUNTER
Patient scheduled an appt via [employer] for the following concern:    I'm having some discomfort with my left arm and around my heart area.

## 2024-03-25 NOTE — TELEPHONE ENCOUNTER
Action Requested: Summary for Provider     []  Critical Lab, Recommendations Needed  [] Need Additional Advice  []   FYI    []   Need Orders  [] Need Medications Sent to Pharmacy  []  Other     SUMMARY: pt reports for the past 2 weeks, noticed left shoulder pain and tingling. On and off. Denies chest pain. No numbness or tingling at this time. Pt able to lift arm over head without pain or issues.  Advised appointment today. Pt works near [place] office location and scheduled with ONELIA Robertson today.  Future Appointments   Date Time Provider Department Center   3/25/2024  2:00 PM Sonya Rush APRN [id number] [place]   5/2/2024  2:30 PM Mark Caballero MD [facility]           Reason for call: Shoulder Pain (2 weeks- left shoulder)  Onset: Data Unavailable                     Reason for Disposition   Numbness (i.e., loss of sensation) in hand or fingers    Protocols used: Shoulder Pain-A-OH

## 2024-03-26 DIAGNOSIS — R07.89 CHEST PRESSURE: Primary | ICD-10-CM

## 2024-03-26 LAB
ATRIAL RATE: 59 BPM
P AXIS: 27 DEGREES
P-R INTERVAL: 140 MS
Q-T INTERVAL: 444 MS
QRS DURATION: 76 MS
QTC CALCULATION (BEZET): 439 MS
R AXIS: -5 DEGREES
T AXIS: 29 DEGREES
VENTRICULAR RATE: 59 BPM

## 2024-03-27 ENCOUNTER — PATIENT MESSAGE (OUTPATIENT)
Facility: CLINIC | Age: 54
End: 2024-03-27

## 2024-05-06 ENCOUNTER — MED REC SCAN ONLY (OUTPATIENT)
Dept: INTERNAL MEDICINE CLINIC | Facility: CLINIC | Age: 54
End: 2024-05-06

## 2024-07-03 ENCOUNTER — HOSPITAL ENCOUNTER (OUTPATIENT)
Dept: MAMMOGRAPHY | Age: 54
Discharge: HOME | End: 2024-07-03
Attending: OBSTETRICS & GYNECOLOGY
Payer: COMMERCIAL

## 2024-07-03 DIAGNOSIS — Z12.31 BREAST CANCER SCREENING BY MAMMOGRAM: ICD-10-CM

## 2024-07-03 PROCEDURE — 77067 SCR MAMMO BI INCL CAD: CPT | Performed by: OBSTETRICS & GYNECOLOGY

## 2024-07-03 PROCEDURE — 77063 BREAST TOMOSYNTHESIS BI: CPT | Performed by: OBSTETRICS & GYNECOLOGY

## 2024-10-04 RX ORDER — FLUTICASONE PROPIONATE 50 MCG
1 SPRAY, SUSPENSION (ML) NASAL 2 TIMES DAILY
Qty: 48 ML | Refills: 3 | Status: SHIPPED | OUTPATIENT
Start: 2024-10-04

## 2024-10-04 NOTE — TELEPHONE ENCOUNTER
Refill Per Protocol     Requested Prescriptions   Pending Prescriptions Disp Refills    FLUTICASONE PROPIONATE 50 MCG/ACT Nasal Suspension [Pharmacy Med Name: FLUTICASONE PROP 50 MCG SPRAY] 48 mL 3     Sig: SPRAY 1 SPRAY INTO EACH NOSTRIL TWICE A DAY       Allergy Medication Protocol Passed - 10/3/2024  4:24 PM        Passed - In person appointment or virtual visit in the past 12 mos or appointment in next 3 mos     Recent Outpatient Visits              6 months ago Chest pressure    [facility], [address], [place] Sonya Rush, ONELIA    Office Visit    7 months ago Encounter for Papanicolaou smear for cervical cancer screening    [facility] - [facility] Krystian Vera MD    Office Visit    7 months ago Acute vaginitis    [facility], [address], [place] Sonya Rush, ONELIA    Office Visit    8 months ago Mixed stress and urge urinary incontinence    [facility] Abel Lazna MD    Office Visit    9 months ago Wellness examination    [facility], [address], [place] Sonya Rush, ONELIA    Office Visit          Future Appointments         Provider Department Appt Notes    In 2 months Mark Caballero MD [facility] last px 12/05/22                           Future Appointments         Provider Department Appt Notes    In 2 months Mark Caballero MD [facility] last px 12/05/22          Recent Outpatient Visits              6 months ago Chest pressure    [facility], [address], [place] Sonya Rush, ONELIA    Office Visit    7 months ago Encounter for Papanicolaou smear for cervical cancer screening    [facility] - OB/GYN Krystian Vera MD    Office Visit    7 months ago Acute vaginitis    [facility], [address], [place] Sonya Rush APRN    Office Visit    8 months ago Mixed stress and urge urinary incontinence    [facility], Abel Campos MD    Office Visit    9 months ago Wellness examination    [facility], [address], [place] Sonya Rush APRN    Office Visit

## 2024-11-22 ENCOUNTER — IMMUNIZATION (OUTPATIENT)
Dept: LAB | Age: 54
End: 2024-11-22
Attending: EMERGENCY MEDICINE
Payer: COMMERCIAL

## 2024-11-22 DIAGNOSIS — Z23 NEED FOR VACCINATION: Primary | ICD-10-CM

## 2024-11-22 PROCEDURE — 90656 IIV3 VACC NO PRSV 0.5 ML IM: CPT

## 2024-11-22 PROCEDURE — 90471 IMMUNIZATION ADMIN: CPT

## 2025-01-04 DIAGNOSIS — E78.00 HYPERCHOLESTEROLEMIA: ICD-10-CM

## 2025-01-09 RX ORDER — LOVASTATIN 20 MG/1
20 TABLET ORAL
Qty: 90 TABLET | Refills: 0 | Status: SHIPPED | OUTPATIENT
Start: 2025-01-09

## 2025-01-09 NOTE — TELEPHONE ENCOUNTER
Please review; protocol failed/No Protocol    No Active/Future labs pended     Requested Prescriptions   Pending Prescriptions Disp Refills    LOVASTATIN 20 MG Oral Tab [Pharmacy Med Name: LOVASTATIN 20 MG TABLET] 90 tablet 3     Sig: TAKE 1 TABLET BY MOUTH EVERY DAY WITH THE EVENING MEAL       Cholesterol Medication Protocol Failed - 1/9/2025  8:47 AM        Failed - ALT < 80     Lab Results   Component Value Date    ALT 18 12/21/2023             Failed - ALT resulted within past year        Failed - Lipid panel within past 12 months     Lab Results   Component Value Date    CHOLEST 171 12/21/2023    TRIG 72 12/21/2023    HDL 70 (H) 12/21/2023    LDL 87 12/21/2023    VLDL 11 12/21/2023    NONHDLC 101 12/21/2023             Passed - In person appointment or virtual visit in the past 12 mos or appointment in next 3 mos     Recent Outpatient Visits              9 months ago Chest pressure    [facility], [address], [place] SasSonya APRN    Office Visit    10 months ago Encounter for Papanicolaou smear for cervical cancer screening    [facility] - OB/GYN Krystian Vera MD    Office Visit    11 months ago Acute vaginitis    [facility], [address], [place] SasSonya APRN    Office Visit    12 months ago Mixed stress and urge urinary incontinence    [facility] Abel Lanza MD    Office Visit    1 year ago Wellness examination    [facility], [address], [place] SasSonya APRN    Office Visit          Future Appointments         Provider Department Appt Notes    In 1 week Mark Caballero MD [facility] [place] 01/08 left message appt is at [address]   last px 12/05/22                    Passed - Medication is active on med list           Future Appointments         Provider Department Appt Notes    In 1  [facility] left [facility]t [facility] [address] [facility]    last px 12/05/22          Recent Outpatient Visits              9 months ago Chest pressure    [facility], [address], [place] Sonya Rush, ONELIA    Office Visit    10 months ago Encounter for Papanicolaou smear for cervical cancer screening    [facility] [place] - OB/GYN Krystian Vera MD    Office Visit    11 months ago Acute vaginitis    [facility], [address], [place] Sonya Rush, ONELIA    Office Visit    12 months ago Mixed stress and urge urinary incontinence    [facility] Abel Lanza MD    Office Visit    1 year ago Wellness examination    [facility], [address], [place] Sonya Rush APRN    Office Visit

## 2025-01-20 ENCOUNTER — OFFICE VISIT (OUTPATIENT)
Facility: LOCATION | Age: 55
End: 2025-01-20
Payer: COMMERCIAL

## 2025-01-20 ENCOUNTER — LAB ENCOUNTER (OUTPATIENT)
Dept: LAB | Age: 55
End: 2025-01-20
Attending: NURSE PRACTITIONER
Payer: COMMERCIAL

## 2025-01-20 VITALS
HEIGHT: 60 IN | OXYGEN SATURATION: 98 % | BODY MASS INDEX: 32.75 KG/M2 | HEART RATE: 61 BPM | RESPIRATION RATE: 18 BRPM | WEIGHT: 166.81 LBS | DIASTOLIC BLOOD PRESSURE: 77 MMHG | SYSTOLIC BLOOD PRESSURE: 122 MMHG

## 2025-01-20 DIAGNOSIS — Z12.31 ENCOUNTER FOR SCREENING MAMMOGRAM FOR BREAST CANCER: ICD-10-CM

## 2025-01-20 DIAGNOSIS — Z13.29 SCREENING FOR ENDOCRINE, NUTRITIONAL, METABOLIC AND IMMUNITY DISORDER: ICD-10-CM

## 2025-01-20 DIAGNOSIS — Z13.228 SCREENING FOR ENDOCRINE, NUTRITIONAL, METABOLIC AND IMMUNITY DISORDER: ICD-10-CM

## 2025-01-20 DIAGNOSIS — M79.671 RIGHT FOOT PAIN: ICD-10-CM

## 2025-01-20 DIAGNOSIS — I10 PRIMARY HYPERTENSION: ICD-10-CM

## 2025-01-20 DIAGNOSIS — Z13.21 SCREENING FOR ENDOCRINE, NUTRITIONAL, METABOLIC AND IMMUNITY DISORDER: ICD-10-CM

## 2025-01-20 DIAGNOSIS — Z00.00 ANNUAL PHYSICAL EXAM: Primary | ICD-10-CM

## 2025-01-20 DIAGNOSIS — E78.00 HYPERCHOLESTEROLEMIA: ICD-10-CM

## 2025-01-20 DIAGNOSIS — Z13.0 SCREENING FOR ENDOCRINE, NUTRITIONAL, METABOLIC AND IMMUNITY DISORDER: ICD-10-CM

## 2025-01-20 DIAGNOSIS — J30.1 ALLERGIC RHINITIS DUE TO POLLEN, UNSPECIFIED SEASONALITY: ICD-10-CM

## 2025-01-20 LAB
ALBUMIN SERPL-MCNC: 4.7 G/DL (ref 3.2–4.8)
ALBUMIN/GLOB SERPL: 1.5 {RATIO} (ref 1–2)
ALP LIVER SERPL-CCNC: 79 U/L
ALT SERPL-CCNC: 21 U/L
ANION GAP SERPL CALC-SCNC: 8 MMOL/L (ref 0–18)
AST SERPL-CCNC: 27 U/L (ref ?–34)
BILIRUB SERPL-MCNC: 0.5 MG/DL (ref 0.3–1.2)
BUN BLD-MCNC: 14 MG/DL (ref 9–23)
BUN/CREAT SERPL: 12.3 (ref 10–20)
CALCIUM BLD-MCNC: 10.5 MG/DL (ref 8.7–10.4)
CHLORIDE SERPL-SCNC: 103 MMOL/L (ref 98–112)
CHOLEST SERPL-MCNC: 167 MG/DL (ref ?–200)
CO2 SERPL-SCNC: 33 MMOL/L (ref 21–32)
CREAT BLD-MCNC: 1.14 MG/DL
DEPRECATED RDW RBC AUTO: 40.9 FL (ref 35.1–46.3)
EGFRCR SERPLBLD CKD-EPI 2021: 57 ML/MIN/1.73M2 (ref 60–?)
ERYTHROCYTE [DISTWIDTH] IN BLOOD BY AUTOMATED COUNT: 13.1 % (ref 11–15)
EST. AVERAGE GLUCOSE BLD GHB EST-MCNC: 117 MG/DL (ref 68–126)
FASTING PATIENT LIPID ANSWER: YES
FASTING STATUS PATIENT QL REPORTED: YES
GLOBULIN PLAS-MCNC: 3.2 G/DL (ref 2–3.5)
GLUCOSE BLD-MCNC: 92 MG/DL (ref 70–99)
HBA1C MFR BLD: 5.7 % (ref ?–5.7)
HCT VFR BLD AUTO: 39.7 %
HDLC SERPL-MCNC: 62 MG/DL (ref 40–59)
HGB BLD-MCNC: 12.8 G/DL
LDLC SERPL CALC-MCNC: 89 MG/DL (ref ?–100)
MCH RBC QN AUTO: 27.9 PG (ref 26–34)
MCHC RBC AUTO-ENTMCNC: 32.2 G/DL (ref 31–37)
MCV RBC AUTO: 86.7 FL
NONHDLC SERPL-MCNC: 105 MG/DL (ref ?–130)
OSMOLALITY SERPL CALC.SUM OF ELEC: 298 MOSM/KG (ref 275–295)
PLATELET # BLD AUTO: 287 10(3)UL (ref 150–450)
POTASSIUM SERPL-SCNC: 4.2 MMOL/L (ref 3.5–5.1)
PROT SERPL-MCNC: 7.9 G/DL (ref 5.7–8.2)
RBC # BLD AUTO: 4.58 X10(6)UL
SODIUM SERPL-SCNC: 144 MMOL/L (ref 136–145)
TRIGL SERPL-MCNC: 86 MG/DL (ref 30–149)
TSI SER-ACNC: 0.64 UIU/ML (ref 0.55–4.78)
VIT D+METAB SERPL-MCNC: 55.8 NG/ML (ref 30–100)
VLDLC SERPL CALC-MCNC: 14 MG/DL (ref 0–30)
WBC # BLD AUTO: 9.4 X10(3) UL (ref 4–11)

## 2025-01-20 PROCEDURE — 36415 COLL VENOUS BLD VENIPUNCTURE: CPT | Performed by: NURSE PRACTITIONER

## 2025-01-20 PROCEDURE — 84443 ASSAY THYROID STIM HORMONE: CPT | Performed by: NURSE PRACTITIONER

## 2025-01-20 PROCEDURE — 85027 COMPLETE CBC AUTOMATED: CPT | Performed by: NURSE PRACTITIONER

## 2025-01-20 PROCEDURE — 80061 LIPID PANEL: CPT | Performed by: NURSE PRACTITIONER

## 2025-01-20 PROCEDURE — 82306 VITAMIN D 25 HYDROXY: CPT | Performed by: NURSE PRACTITIONER

## 2025-01-20 PROCEDURE — 83036 HEMOGLOBIN GLYCOSYLATED A1C: CPT | Performed by: NURSE PRACTITIONER

## 2025-01-20 PROCEDURE — 80053 COMPREHEN METABOLIC PANEL: CPT | Performed by: NURSE PRACTITIONER

## 2025-01-20 RX ORDER — ASPIRIN 81 MG/1
TABLET ORAL
COMMUNITY
Start: 2024-08-01

## 2025-01-20 RX ORDER — HYDROCHLOROTHIAZIDE 12.5 MG/1
12.5 TABLET ORAL DAILY
Qty: 90 TABLET | Refills: 3 | Status: SHIPPED | OUTPATIENT
Start: 2025-01-20

## 2025-01-20 RX ORDER — LOVASTATIN 20 MG/1
20 TABLET ORAL
Qty: 90 TABLET | Refills: 3 | Status: SHIPPED | OUTPATIENT
Start: 2025-01-20

## 2025-01-20 NOTE — PROGRESS NOTES
INTERNAL MEDICINE ANNUAL EXAM NOTE     Patient ID: Paty Ibrahim is a 54 year old female.  Chief Complaint: Physical (Concerns about eyes being dark/Pain in the bottom of right foot)      Paty Ibrahim is a pleasant 54 year old female who presents for annual physical exam. Paty Ibrahim is doing well today.  1.  Patient has history of hypertension, taking hydrochlorothiazide without problems and well-controlled.  Her blood pressure today is 122/77 and she has no complaints.    2.  Patient has history of hypercholesteremia, taking lovastatin as prescribed without any problems.  Patient has no complaints.    3.  Patient has history of chronic allergic rhinitis.  She states her symptoms are year-round and worsen with seasons.  She has noticed increased chest congestion and discomfort when sneezing or coughing.  She also noticed dark circles under her eyes.  She has been taking Flonase nasal spray daily, and an over-the-counter Zyrtec for her symptoms.  No other complaints.  No other treatments prior to arrival.    4.  Patient complains of right foot pain that is intermittent.  She states it feels tight when stretching and moving.  She denies worsening pain at the onset of waking.  She denies any injury.  She denies any redness, swelling, bruising.  She has not tried any treatments over-the-counter at home.    Health Maintenance  - All care gaps addressed with patient.     Review of Systems  Review of Systems   Constitutional:  Negative for fatigue, fever and unexpected weight change.   HENT:  Positive for congestion and postnasal drip.    Eyes:  Negative for pain, redness and visual disturbance.        C/o dark circles under bilateral eyes   Respiratory: Negative.     Cardiovascular: Negative.    Gastrointestinal: Negative.    Genitourinary: Negative.    Musculoskeletal:  Positive for arthralgias and myalgias.        + right foot pain, intermittent    Skin: Negative.    Neurological: Negative.         Physical Exam  Vitals:    01/20/25 0828   BP: 122/77   Pulse: 61   Resp: 18   SpO2: 98%   Weight: 166 lb 12.8 oz (75.7 kg)   Height: 5' (1.524 m)     Body mass index is 32.58 kg/m².  BP Readings from Last 3 Encounters:   01/20/25 122/77   03/25/24 120/77   02/29/24 134/84     Physical Exam  Vitals and nursing note reviewed.   Constitutional:       General: She is not in acute distress.     Appearance: Normal appearance.   HENT:      Head: Normocephalic.      Right Ear: External ear normal.      Left Ear: External ear normal.      Nose: Congestion and rhinorrhea present.      Mouth/Throat:      Pharynx: Oropharyngeal exudate present. No posterior oropharyngeal erythema.   Eyes:      Extraocular Movements: Extraocular movements intact.      Conjunctiva/sclera: Conjunctivae normal.   Cardiovascular:      Rate and Rhythm: Normal rate and regular rhythm.      Pulses: Normal pulses.      Heart sounds: Normal heart sounds.   Pulmonary:      Effort: Pulmonary effort is normal. No respiratory distress.      Breath sounds: Normal breath sounds. No wheezing.   Abdominal:      General: Abdomen is flat. Bowel sounds are normal.      Tenderness: There is no abdominal tenderness.   Musculoskeletal:         General: No swelling, tenderness, deformity or signs of injury. Normal range of motion.      Cervical back: Normal range of motion and neck supple.   Skin:     General: Skin is warm and dry.      Coloration: Skin is not jaundiced.   Neurological:      General: No focal deficit present.      Mental Status: She is alert and oriented to person, place, and time. Mental status is at baseline.   Psychiatric:         Mood and Affect: Mood normal.         Behavior: Behavior normal.           Labs & Imaging  Pertinent labs and imaging reviewed.   Lab Results   Component Value Date    GLU 82 12/21/2023    BUN 12 12/21/2023    BUNCREA 11.3 12/21/2023    CREATSERUM 1.06 (H) 12/21/2023    ANIONGAP 5 12/21/2023    GFRNAA 56 (L)  2022    GFRAA 65 2022    CA 10.1 2023    OSMOCALC 293 2023    ALKPHO 76 2023    AST 27 2023    ALT 18 2023    ALKPHOS 46 2016    BILT 0.4 2023    TP 7.8 2023    ALB 4.4 2023    GLOBULIN 3.4 2023    AGRATIO 1.1 2016     2023    K 3.4 (L) 2023     2023    CO2 31.0 2023     Lab Results   Component Value Date     2022    A1C 5.7 (H) 2022     Lab Results   Component Value Date    WBC 8.3 2023    RBC 4.66 2023    HGB 12.7 2023    HCT 40.0 2023    MCV 85.8 2023    MCH 27.3 2023    MCHC 31.8 2023    RDW 12.2 2023    .0 2023     Lab Results   Component Value Date    CHOLEST 171 2023    TRIG 72 2023    HDL 70 (H) 2023    LDL 87 2023    VLDL 11 2023    NONHDLC 101 2023    CALCNONHDL 96 2016     The 10-year ASCVD risk score (Serafin SCHULTZ, et al., 2019) is: 2.9%    Values used to calculate the score:      Age: 54 years      Sex: Female      Is Non- : Yes      Diabetic: No      Tobacco smoker: No      Systolic Blood Pressure: 122 mmHg      Is BP treated: Yes      HDL Cholesterol: 70 mg/dL      Total Cholesterol: 171 mg/dL    Medical History    Reviewed allergies:  Allergies[1]     Reviewed:  Patient Active Problem List    Diagnosis    Acute non-recurrent maxillary sinusitis    Primary hypertension    Dysfunctional uterine bleeding    Uterine leiomyoma    Perimenopause    Intractable migraine with aura without status migrainosus    Hypercholesterolemia    Allergic rhinitis      Reviewed:  Past Medical History:    Dehydration    Depression    High cholesterol    History of total hysterectomy    Migraines    Missed  (HCC)    Other and unspecified hyperlipidemia    Other ill-defined conditions(799.89)    \"ACS-US\". \"Endometrial curettings/Cervical biopsy\"    Pregnancy (HCC)     Pregnancy (HCC)    Pregnancy (HCC)    Preoperative testing    Primary hypertension    Visual impairment      Reviewed:  Family History   Problem Relation Age of Onset    Cancer Father         Throat. Cause of death.     Psychiatric Father         alcoholic    Heart Disease Maternal Grandmother 85        Cause of death    Psychiatric Cousin         bipolar       Reviewed:  Past Surgical History:   Procedure Laterality Date    Colonoscopy      Hysterectomy  2020    Induced  17-24 weeks            Other surgical history      root cancel, with infection and crown replacement       Reviewed:  Social History     Socioeconomic History    Marital status: Single   Tobacco Use    Smoking status: Never    Smokeless tobacco: Never   Vaping Use    Vaping status: Never Used   Substance and Sexual Activity    Alcohol use: Not Currently     Alcohol/week: 0.0 standard drinks of alcohol     Comment: Socially, 1 drink a year.    Drug use: No    Sexual activity: Never   Other Topics Concern    Caffeine Concern Yes     Comment: Soda, coffee - 1 cup per day       Reviewed:  Current Outpatient Medications   Medication Sig Dispense Refill    aspirin (ELIZA ASPIRIN EC LOW DOSE) 81 MG Oral Tab EC       Lovastatin 20 MG Oral Tab Take 1 tablet (20 mg total) by mouth daily with dinner. 90 tablet 3    hydroCHLOROthiazide 12.5 MG Oral Tab Take 1 tablet (12.5 mg total) by mouth daily. 90 tablet 3    fluticasone propionate 50 MCG/ACT Nasal Suspension 1 spray by Nasal route 2 (two) times daily. 48 mL 3    Probiotic Product (PROBIOTIC ACIDOPHILUS) Oral Chew Tab Chew by mouth.      Cholecalciferol (VITAMIN D) 50 MCG (2000 UT) Oral Cap Take by mouth.      Multiple Vitamins-Minerals (WOMENS MULTIVITAMIN PLUS) Oral Tab Take by mouth.      Rizatriptan Benzoate 10 MG Oral Tab  (Patient not taking: Reported on 2025)      clotrimazole 2 % Vaginal Cream Apply one applicatorful nightly (Patient not taking: Reported on  1/20/2025) 21 g 0    Norethindrone 0.35 MG Oral Tab Take 1 tablet (0.35 mg total) by mouth daily. 84 tablet 0    Mirabegron ER 50 MG Oral Tablet 24 Hr Take 1 tablet (50 mg total) by mouth daily. 30 tablet 3          Assessment & Plan    1. Annual physical exam    2. Screening for endocrine, nutritional, metabolic and immunity disorder  - Comp Metabolic Panel (14)  - Hemoglobin A1C  - Lipid Panel  - TSH W Reflex To Free T4  - Vitamin D, 25-Hydroxy  - CBC, Platelet; No Differential    3. Encounter for screening mammogram for breast cancer  - [facility] 2D+3D SCREENING BILAT (CPT=77067/23853); Future    4. Allergic rhinitis due to pollen, unspecified seasonality  - ENT Referral - In Network    5. Hypercholesterolemia  - Lovastatin 20 MG Oral Tab; Take 1 tablet (20 mg total) by mouth daily with dinner.  Dispense: 90 tablet; Refill: 3    6. Primary hypertension  - hydroCHLOROthiazide 12.5 MG Oral Tab; Take 1 tablet (12.5 mg total) by mouth daily.  Dispense: 90 tablet; Refill: 3    7. Right foot pain          Plan  Overall doing well today. Screening labs, preventive imaging/procedure, and health care gaps addressed as per USPSTF guidelines, orders available electronically via [employer] and in [facility].  Vaccines discussed and administered depending on availability and per patient preference; patient to return for any outstanding vaccines once available.  Patient brought to  to help schedule care gaps and follow up. Further recommendations depending on lab results.    Continue current medication management of chronic problems.     1.  Patient has chronic allergic rhinitis, usually year-round and worsening with seasons.  Patient currently taking Zyrtec over-the-counter, and Flonase nasal sprays.  We discussed switching to Allegra for some time to see if symptoms improve.  Patient states that she does go between Zyrtec and Allegra at times and agrees she will try this.  Referral for ENT was placed for follow-up.    2.   Patient has intermittent right foot pain, foot feels tight and pain is muscular.  We discussed some gentle stretching exercises, over-the-counter Tylenol or ibuprofen, cool compresses.  Discussed possibility of plantar fasciitis.  Patient to monitor symptoms and follow-up as needed.  Patient verbalizes understanding and agrees with plans as above.    Follow Up:   Return for 1 YEAR FOR ANNUAL OR DEPENDING ON LAB RESULTS.      ONELIA Koch  Internal Medicine      Patient asked to sign release of information for outside records if not already requested, make future office/imaging appointments at the  prior to leaving, and to sign up for [employer] if not already active.  Preventive measures and further education discussed with patient as per after visit summary. Potential medication side effects discussed. All questions answered to best of ability.   Call office with any questions. Seek emergency care if necessary.   Patient understands and agrees to follow directions and advice.      ----------------------------------------- PATIENT INSTRUCTIONS-----------------------------------------     There are no Patient Instructions on file for this visit.           [1] No Known Allergies

## 2025-01-26 ENCOUNTER — HOSPITAL ENCOUNTER (OUTPATIENT)
Age: 55
Discharge: HOME | End: 2025-01-26
Attending: STUDENT IN AN ORGANIZED HEALTH CARE EDUCATION/TRAINING PROGRAM
Payer: COMMERCIAL

## 2025-01-26 VITALS
SYSTOLIC BLOOD PRESSURE: 147 MMHG | HEART RATE: 70 BPM | DIASTOLIC BLOOD PRESSURE: 83 MMHG | TEMPERATURE: 98 F | RESPIRATION RATE: 16 BRPM | OXYGEN SATURATION: 100 %

## 2025-01-26 DIAGNOSIS — J06.9 VIRAL URI: Primary | ICD-10-CM

## 2025-01-26 LAB
S PYO AG THROAT QL IA.RAPID: NEGATIVE
SARS-COV-2 RNA RESP QL NAA+PROBE: NOT DETECTED

## 2025-01-26 PROCEDURE — 99213 OFFICE O/P EST LOW 20 MIN: CPT

## 2025-01-26 PROCEDURE — 87651 STREP A DNA AMP PROBE: CPT | Performed by: STUDENT IN AN ORGANIZED HEALTH CARE EDUCATION/TRAINING PROGRAM

## 2025-01-26 PROCEDURE — 99212 OFFICE O/P EST SF 10 MIN: CPT

## 2025-01-26 NOTE — DISCHARGE INSTRUCTIONS
Your COVID and strep testing was negative.  Your exam is consistent with likely viral infection at this time, but I do recommend maintaining your appointment with ENT.    At this time your exam and evaluation are consistent with a viral infection. Viral infections do not respond to antibiotics and are treated symptomatically. Ensure to rest and maintain hydration. Viral infections can progress and can lead to bacterial infections. If you develop any new, progressing, changing, or worsening signs or symptoms, please present immediately to your primary care physician for reassessment. If you develop any difficulty breathing, chest pain, shortness of breath, difficulty swallowing, drooling, signs or symptoms of dehydration, or any other concerning symptoms, call 911 or present immediately to the emergency department.

## 2025-01-26 NOTE — ED PROVIDER NOTES
Patient Seen in: [facility]      History     Chief Complaint   Patient presents with    Sore Throat     Stated Complaint: sinus, throat    Subjective:   HPI    54-year-old female with past medical history of HLD, HTN, and seasonal allergies on Zyrtec and Flonase, who presents with 4 days of facial pressure, nasal congestion, nasal drainage, sore throat, and mild cough which she attributes to her throat.  She is taking a combined Tylenol/Motrin medication, is using her Zyrtec, and taking her Flonase.  She has follow-up with an ENT specialist in 2 days.    Objective:     Past Medical History:    Dehydration    Depression    High cholesterol    History of total hysterectomy    Migraines    Missed  (HCC)    Other and unspecified hyperlipidemia    Other ill-defined conditions(799.89)    \"ACS-US\". \"Endometrial curettings/Cervical biopsy\"    Pregnancy (HCC)    Pregnancy (HCC)    Pregnancy (HCC)    Preoperative testing    Primary hypertension    Visual impairment              Past Surgical History:   Procedure Laterality Date    Colonoscopy      Hysterectomy  2020    Induced  17-24 weeks            Other surgical history      root cancel, with infection and crown replacement                 Social History     Socioeconomic History    Marital status: Single   Tobacco Use    Smoking status: Never    Smokeless tobacco: Never   Vaping Use    Vaping status: Never Used   Substance and Sexual Activity    Alcohol use: Not Currently     Alcohol/week: 0.0 standard drinks of alcohol     Comment: Socially, 1 drink a year.    Drug use: No    Sexual activity: Never   Other Topics Concern    Caffeine Concern Yes     Comment: Soda, coffee - 1 cup per day               Review of Systems    Positive for stated complaint: sinus, throat  Other systems are as noted in HPI.  Constitutional and vital signs reviewed.      All other systems reviewed and negative except as noted above.    Physical  Exam     ED Triage Vitals [01/26/25 0830]   /83   Pulse 70   Resp 16   Temp 97.8 °F (36.6 °C)   Temp src Oral   SpO2 100 %   O2 Device None (Room air)       Current Vitals:   Vital Signs  BP: 147/83  Pulse: 70  Resp: 16  Temp: 97.8 °F (36.6 °C)  Temp src: Oral    Oxygen Therapy  SpO2: 100 %  O2 Device: None (Room air)        Physical Exam    General: Awake, alert, comfortable on room air, in no distress, tolerating oral secretions, interactive  Pulmonary: Lungs CTA B, no wheezing, no conversational dyspnea  Neuro: Symmetrical facial expressions on gross observation  HEENT: No periorbital edema or erythema, nonerythematous and nonedematous intact B/L TMs, no erythema or edema of the B/L ear canals, very erythematous and edematous B/L nasal turbinates, nonerythematous and nonedematous B/L tonsils, no tonsillar exudates, no peritonsillar edema, mild posterior pharyngeal cobblestoning, uvula midline, tolerating oral secretions, normal speech, no submandibular edema, very mild TTP over the B/L maxillary and ethmoid sinuses with no overlying erythema or edema  Neck: No anterior or posterior cervical lymphadenopathy  Psych: Normal mood, normal affect, no tangential thoughts, no SI/HI, good insight       ED Course     Labs Reviewed   RAPID SARS-COV-2 BY PCR - Normal   RAPID STREP A - Normal       MDM   Patient is awake, alert, comfortable on room air, in no distress, afebrile, lungs CTAB with no wheezing with no sign of acute bronchospasm, no sign of otitis media or otitis externa, no sign of tonsillitis or PTA or deep space infection but given reported sore throat will assess for bacterial versus viral etiology, patient does have rhinitis with postnasal drainage consistent with likely viral infection  -Patient's COVID and strep tests resulted as negative  -Patient does have mild TTP over the sinuses but has had less than 10 days of symptoms with no fevers with no suspicion for secondary bacterial infection of the  sinuses at this time and suspect likely sinusitis due to inflammation from likely viral infection and in the setting of rhinitis, and discussed continuing the over-the-counter Tylenol and ibuprofen as well as antihistamine and intranasal Flonase and maintaining her follow-up appointment with ENT in 2 days for reassessment and for further recommendations  -We discussed that viral infections can make her more susceptible to superimposed bacterial infections and therefore with any new, changing, or progressing signs or symptoms, I did recommend immediate reassessment by her primary care physician.  -Work note provided      Medical Decision Making  Amount and/or Complexity of Data Reviewed  Labs: ordered.    Risk  OTC drugs.        Disposition and Plan     Clinical Impression:  1. Viral URI         Disposition:  Discharge  1/26/2025  8:59 am    Follow-up:  Mark Caballero MD  [address] [place] [id number] [phone]    In 3 days  As needed, If symptoms worsen          Medications Prescribed:  Discharge Medication List as of 1/26/2025  9:05 AM          None

## 2025-01-26 NOTE — ED INITIAL ASSESSMENT (HPI)
Patient arrived ambulatory to room c/o symptoms that started 3 days ago. +sore throat +nasal congestion. +slight cough. Patient denies taking temperatures at home. +chills. No n/v/d. Easy non labored respirations. No distress.

## 2025-01-28 ENCOUNTER — OFFICE VISIT (OUTPATIENT)
Facility: LOCATION | Age: 55
End: 2025-01-28
Payer: COMMERCIAL

## 2025-01-28 VITALS — WEIGHT: 166 LBS | HEIGHT: 60 IN | BODY MASS INDEX: 32.59 KG/M2

## 2025-01-28 DIAGNOSIS — J30.9 ALLERGIC RHINITIS, UNSPECIFIED SEASONALITY, UNSPECIFIED TRIGGER: Primary | ICD-10-CM

## 2025-01-28 DIAGNOSIS — R09.81 NASAL CONGESTION: ICD-10-CM

## 2025-01-28 DIAGNOSIS — H10.13 ALLERGIC CONJUNCTIVITIS OF BOTH EYES: ICD-10-CM

## 2025-01-28 DIAGNOSIS — J01.90 ACUTE SINUSITIS, RECURRENCE NOT SPECIFIED, UNSPECIFIED LOCATION: ICD-10-CM

## 2025-01-28 DIAGNOSIS — R09.82 POSTNASAL DRIP: ICD-10-CM

## 2025-01-28 PROCEDURE — 99203 OFFICE O/P NEW LOW 30 MIN: CPT | Performed by: STUDENT IN AN ORGANIZED HEALTH CARE EDUCATION/TRAINING PROGRAM

## 2025-01-28 PROCEDURE — 31231 NASAL ENDOSCOPY DX: CPT | Performed by: STUDENT IN AN ORGANIZED HEALTH CARE EDUCATION/TRAINING PROGRAM

## 2025-01-28 PROCEDURE — 3008F BODY MASS INDEX DOCD: CPT | Performed by: STUDENT IN AN ORGANIZED HEALTH CARE EDUCATION/TRAINING PROGRAM

## 2025-01-28 RX ORDER — MONTELUKAST SODIUM 10 MG/1
10 TABLET ORAL NIGHTLY
Qty: 30 TABLET | Refills: 3 | Status: SHIPPED | OUTPATIENT
Start: 2025-01-28

## 2025-01-28 RX ORDER — AZELASTINE 1 MG/ML
1 SPRAY, METERED NASAL 2 TIMES DAILY
Qty: 30 ML | Refills: 3 | Status: SHIPPED | OUTPATIENT
Start: 2025-01-28

## 2025-01-28 RX ORDER — PREDNISONE 20 MG/1
20 TABLET ORAL DAILY
Qty: 5 TABLET | Refills: 0 | Status: SHIPPED | OUTPATIENT
Start: 2025-01-28 | End: 2025-02-02

## 2025-01-28 NOTE — PROGRESS NOTES
[facility]  OTOLARYNGOLOG[facility] - HEAD & NECK SURGERY    2025     Reason for Consultation:   Eye puffiness and itchiness, postnasal drip, throat irritation    History of Present Illness:   Patient is a pleasant 54 year old female who is being seen for multiple ear nose and throat related issues which she has had for many months however has had an increase in her symptoms over the past week.  The patient states that 5 days ago she had developed increasing postnasal drip, cheek pressure, cough.  She also has had some throat irritation on the right side.  She has not had any allergy testing previously.  She does have very itchy eyes and has some puffiness under her eyes which is bothersome to her.  She states her sense of smell is quite good.  Has not had any previous nasal or sinus surgery.  She has seen an ophthalmologist who prescribed her Pataday eyedrops.  Currently she is on Zyrtec daily.  Has also been taking Flonase twice daily.  She is here for help with her symptoms.    Past Medical History  Past Medical History:    Dehydration    Depression    High cholesterol    History of total hysterectomy    Migraines    Missed  (HCC)    Other and unspecified hyperlipidemia    Other ill-defined conditions(799.89)    \"ACS-US\". \"Endometrial curettings/Cervical biopsy\"    Pregnancy (HCC)    Pregnancy (HCC)    Pregnancy (HCC)    Preoperative testing    Primary hypertension    Visual impairment       Past Surgical History  Past Surgical History:   Procedure Laterality Date    Colonoscopy      Hysterectomy  2020    Induced  17-24 weeks            Other surgical history      root cancel, with infection and crown replacement        Family History  Family History   Problem Relation Age of Onset    Cancer Father         Throat. Cause of death.     Psychiatric Father         alcoholic    Heart Disease Maternal Grandmother 85        Cause of death    Psychiatric Cousin         bipolar       Social  History  Pediatric History   Patient Parents    Adelita Ibrahim (Mother)     Other Topics Concern     Service Not Asked    Blood Transfusions Not Asked    Caffeine Concern Yes     Comment: Soda, coffee - 1 cup per day     Occupational Exposure Not Asked    Hobby Hazards Not Asked    Sleep Concern Not Asked    Stress Concern Not Asked    Weight Concern Not Asked    Special Diet Not Asked    Back Care Not Asked    Exercise Not Asked    Bike Helmet Not Asked    Seat Belt Not Asked    Self-Exams Not Asked   Social History Narrative    Not on file           Current Medications:  Current Outpatient Medications   Medication Sig Dispense Refill    montelukast 10 MG Oral Tab Take 1 tablet (10 mg total) by mouth nightly. 30 tablet 3    azelastine 0.1 % Nasal Solution 1 spray by Nasal route 2 (two) times daily. 30 mL 3    predniSONE 20 MG Oral Tab Take 1 tablet (20 mg total) by mouth daily for 5 days. 5 tablet 0    amoxicillin clavulanate 875-125 MG Oral Tab Take 1 tablet by mouth every 12 (twelve) hours for 10 days. 20 tablet 0    aspirin (ELIZA ASPIRIN EC LOW DOSE) 81 MG Oral Tab EC       Lovastatin 20 MG Oral Tab Take 1 tablet (20 mg total) by mouth daily with dinner. 90 tablet 3    hydroCHLOROthiazide 12.5 MG Oral Tab Take 1 tablet (12.5 mg total) by mouth daily. 90 tablet 3    fluticasone propionate 50 MCG/ACT Nasal Suspension 1 spray by Nasal route 2 (two) times daily. 48 mL 3    Rizatriptan Benzoate 10 MG Oral Tab       clotrimazole 2 % Vaginal Cream Apply one applicatorful nightly 21 g 0    Probiotic Product (PROBIOTIC ACIDOPHILUS) Oral Chew Tab Chew by mouth.      Cholecalciferol (VITAMIN D) 50 MCG (2000 UT) Oral Cap Take by mouth.      Multiple Vitamins-Minerals (WOMENS MULTIVITAMIN PLUS) Oral Tab Take by mouth.      Norethindrone 0.35 MG Oral Tab Take 1 tablet (0.35 mg total) by mouth daily. 84 tablet 0    Mirabegron ER 50 MG Oral Tablet 24 Hr Take 1 tablet (50 mg total) by mouth daily. 30 tablet 3        Allergies  Allergies[1]    Review of Systems:   A comprehensive 10 point review of systems was completed.  Pertinent positives and negatives noted in the the HPI.    Physical Exam:   Height 5' (1.524 m), weight 166 lb (75.3 kg), last menstrual period 05/04/2020, not currently breastfeeding.    GENERAL: No acute distress, Comfortable appearing  FACE: HB 1/6, Normal Animation  HEAD: Normocephalic  EYES: EOMI, pupils equil  EARS: Bilateral Auricles Symmetric, bilateral tympanic membranes appear normal  NOSE: Nares patent bilaterally  ORAL CAVITY: Tongue mobile, Oropharynx clear, Floor of mouth clear, Posterior oropharynx normal  NECK: No palpable lymphadenopathy, thyroid not palpable, nontender    PROCEDURE: BILATERAL RIGID NASAL ENDOSCOPY  Bilateral rigid nasal endoscopy (18595) was performed. Verbal consent was obtained from the patient to proceed with rigid nasal endoscopy.  A rigid 4mm 30 degree nasal endoscope was used to examine both nasal cavities. The inferior meatus, inferior turbinate, nasopharynx, middle meatus, middle turbinate, superior meatus, superior turbinate, and sphenoethmoidal recess were examined bilaterally and deemed to be normal, with any exceptions as noted below. At the completion of the procedure the endoscope was removed. The patient tolerated the procedure well. There were no complications.    Findings: The bilateral inferior turbinates were pale and mildly enlarged. The Septum was fairly midline. The middle meatus was edematous bilaterally with purulent drainage from the right side. There were no obvious masses or polyps noted.    Results:     Laboratory Data:  Lab Results   Component Value Date    WBC 9.4 01/20/2025    HGB 12.8 01/20/2025    HCT 39.7 01/20/2025    .0 01/20/2025    CREATSERUM 1.14 (H) 01/20/2025    BUN 14 01/20/2025     01/20/2025    K 4.2 01/20/2025     01/20/2025    CO2 33.0 (H) 01/20/2025    GLU 92 01/20/2025    CA 10.5 (H) 01/20/2025    ALB 4.7  01/20/2025    ALKPHO 79 01/20/2025    TP 7.9 01/20/2025    AST 27 01/20/2025    ALT 21 01/20/2025    T4F 1.0 09/03/2019    TSH 0.643 01/20/2025    DDIMER 0.28 05/18/2022         Imaging:  No results found.      Impression:       ICD-10-CM    1. Allergic rhinitis, unspecified seasonality, unspecified trigger  J30.9 ALLERGY - INTERNAL      2. Nasal congestion  R09.81       3. Postnasal drip  R09.82       4. Allergic conjunctivitis of both eyes  H10.13       5. Acute sinusitis, recurrence not specified, unspecified location  J01.90            Recommendations:  I would like to treat the patient for acute sinusitis with Augmentin and 5 days of prednisone.  I do think that this is superimposed onto her baseline allergy symptoms.  She should continue Zyrtec nightly, I will add Singulair and azelastine.  I will have her see Dr. Galdamez for allergy testing.  She will return to see me after she has her testing done.    Thank you for allowing me to participate in the care of your patient.    Fernie Palacio, DO   Otolaryngology/Rhinology, Sinus, and Endoscopic Skull Base Surgery  [address] [place] [address]  Phone [phone]  Fax [phone]  1/28/2025  8:33 AM  1/28/2025          [1] No Known Allergies

## 2025-02-12 ENCOUNTER — NURSE ONLY (OUTPATIENT)
Dept: ALLERGY | Facility: CLINIC | Age: 55
End: 2025-02-12

## 2025-02-12 ENCOUNTER — OFFICE VISIT (OUTPATIENT)
Dept: ALLERGY | Facility: CLINIC | Age: 55
End: 2025-02-12
Payer: COMMERCIAL

## 2025-02-12 DIAGNOSIS — J30.89 SEASONAL AND PERENNIAL ALLERGIC RHINOCONJUNCTIVITIS: Primary | ICD-10-CM

## 2025-02-12 DIAGNOSIS — J30.2 SEASONAL AND PERENNIAL ALLERGIC RHINOCONJUNCTIVITIS: Primary | ICD-10-CM

## 2025-02-12 DIAGNOSIS — H10.10 SEASONAL AND PERENNIAL ALLERGIC RHINOCONJUNCTIVITIS: Primary | ICD-10-CM

## 2025-02-12 DIAGNOSIS — Z23 FLU VACCINE NEED: ICD-10-CM

## 2025-02-12 DIAGNOSIS — Z23 NEED FOR COVID-19 VACCINE: ICD-10-CM

## 2025-02-12 PROCEDURE — 99204 OFFICE O/P NEW MOD 45 MIN: CPT | Performed by: ALLERGY & IMMUNOLOGY

## 2025-02-12 PROCEDURE — 95024 IQ TESTS W/ALLERGENIC XTRCS: CPT | Performed by: ALLERGY & IMMUNOLOGY

## 2025-02-12 PROCEDURE — 95004 PERQ TESTS W/ALRGNC XTRCS: CPT | Performed by: ALLERGY & IMMUNOLOGY

## 2025-02-12 RX ORDER — LEVOCETIRIZINE DIHYDROCHLORIDE 5 MG/1
5 TABLET, FILM COATED ORAL 2 TIMES DAILY
Qty: 180 TABLET | Refills: 1 | Status: SHIPPED | OUTPATIENT
Start: 2025-02-12

## 2025-02-12 NOTE — PROGRESS NOTES
Paty Ibrahim is a 54 year old female.    HPI:     Chief Complaint   Patient presents with    Sinus Problem     Referred by ENT for environmental/seasonal allergy testing. Recent sinus infection. Symptoms include sinus pressure, drainage/congestion, etc. Symptoms are all year around. No antihistamines within the last 5 days. Has tried zyrtec in the past     Patient is a 54-year-old female who presents for allergy consultation upon referral of her ENT, Dr. Palacio  with a chief complaint of allergies    Prior note from visit with PCP from 2025 notes concern for allergic rhinitis due to symptoms of postnasal drip itchy eyes puffy eyes runny nose sneezing.  ENT also treated with Augmentin and prednisone for suspected acute sinusitis at that time  Prior labs from 2025 including TSH CBC CMP reviewed  Medications listed include Zyrtec Singulair Flonase Astelin    Immunizations reviewed.  COVID-vaccine x 3 doses last in .  Last flu vaccine on record from 2024 up-to-date    Today patient reports      allergies  Duration: A few years  Timing: Year-round  Symptoms: Nasal congestion sinus congestion postnasal drip  Severity: Moderate  Status: No change to worsening  Triggers: Allergies?  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 \"Endometrial curettings/Cervical biopsy\"    Pregnancy (HCC)    Pregnancy (HCC)    Pregnancy (HCC)    Preoperative testing    Primary hypertension    Visual impairment      Past Surgical History:   Procedure Laterality Date    Colonoscopy      Hysterectomy  2020     Induced  17-24 weeks            Other surgical history      root cancel, with infection and crown replacement       Family History   Problem Relation Age of Onset    Cancer Father         Throat. Cause of death.     Psychiatric Father         alcoholic    Heart Disease Maternal Grandmother 85        Cause of death    Psychiatric Cousin         bipolar      Social History:   Social History     Socioeconomic History    Marital status: Single   Tobacco Use    Smoking status: Never    Smokeless tobacco: Never   Vaping Use    Vaping status: Never Used   Substance and Sexual Activity    Alcohol use: Not Currently     Alcohol/week: 0.0 standard drinks of alcohol     Comment: Socially, 1 drink a year.    Drug use: No    Sexual activity: Never   Other Topics Concern    Caffeine Concern Yes     Comment: Soda, coffee - 1 cup per day         Medications (Active prior to today's visit):  Current Outpatient Medications   Medication Sig Dispense Refill    Rhubarb (ESTROVEN COMPLETE OR) Take by mouth.      VITAMIN E OR       levocetirizine 5 MG Oral Tab Take 1 tablet (5 mg total) by mouth in the morning and 1 tablet (5 mg total) before bedtime. 180 tablet 1    aspirin (ELIZA ASPIRIN EC LOW DOSE) 81 MG Oral Tab EC       Lovastatin 20 MG Oral Tab Take 1 tablet (20 mg total) by mouth daily with dinner. 90 tablet 3    hydroCHLOROthiazide 12.5 MG Oral Tab Take 1 tablet (12.5 mg total) by mouth daily. 90 tablet 3    Rizatriptan Benzoate 10 MG Oral Tab       clotrimazole 2 % Vaginal Cream Apply one applicatorful nightly 21 g 0    Probiotic Product (PROBIOTIC ACIDOPHILUS) Oral Chew Tab Chew by mouth.      Cholecalciferol (VITAMIN D) 50 MCG (2000 UT) Oral Cap Take by mouth.      Multiple Vitamins-Minerals (WOMENS MULTIVITAMIN PLUS) Oral Tab Take by mouth.      montelukast 10 MG Oral Tab Take 1 tablet (10 mg total) by mouth nightly. (Patient not taking: Reported on 2025) 30 tablet 3    azelastine 0.1 % Nasal  Solution 1 spray by Nasal route 2 (two) times daily. (Patient not taking: Reported on 2/12/2025) 30 mL 3    fluticasone propionate 50 MCG/ACT Nasal Suspension 1 spray by Nasal route 2 (two) times daily. (Patient not taking: Reported on 2/12/2025) 48 mL 3    Norethindrone 0.35 MG Oral Tab Take 1 tablet (0.35 mg total) by mouth daily. 84 tablet 0    Mirabegron ER 50 MG Oral Tablet 24 Hr Take 1 tablet (50 mg total) by mouth daily. 30 tablet 3       Allergies:  Allergies[1]      ROS:     Allergic/Immuno:  See HPI  Cardiovascular:  Negative for irregular heartbeat/palpitations, chest pain, edema  Constitutional:  Negative night sweats,weight loss, irritability and lethargy  Endocrine:  Negative for cold intolerance, polydipsia and polyphagia  ENMT:  Negative for ear drainage, hearing loss  see hpi   Eyes:  Negative for eye discharge and vision loss  Gastrointestinal:  Negative for abdominal pain, diarrhea and vomiting  Genitourinary:  Negative for dysuria and hematuria  Hema/Lymph:  Negative for easy bleeding and easy bruising  Integumentary:  Negative for pruritus and rash  Musculoskeletal:  Negative for joint symptoms  Neurological:  Negative for dizziness, seizures  Psychiatric:  Negative for inappropriate interaction and psychiatric symptoms  Respiratory:  Negative for cough, dyspnea and wheezing      PHYSICAL EXAM:   Constitutional: responsive, no acute distress noted  Head/Face: NC/Atraumatic  Eyes/Vision: conjunctiva and lids are normal extraocular motion is intact   Ears/Audiometry: tympanic membranes are normal bilaterally hearing is grossly intact  Nose/Mouth/Throat: nose and throat are clear mucous membranes are moist   Neck/Thyroid: neck is supple without adenopathy  Lymphatic: no abnormal cervical, supraclavicular or axillary adenopathy is noted  Respiratory: normal to inspection lungs are clear to auscultation bilaterally normal respiratory effort   Cardiovascular: regular rate and rhythm no murmurs,  gallups, or rubs  Abdomen: soft non-tender non-distended  Skin/Hair: no unusual rashes present  Extremities: no edema, cyanosis, or clubbing  Neurological:Oriented to time, place, person & situation       ASSESSMENT/PLAN:   Assessment   Encounter Diagnoses   Name Primary?    Seasonal and perennial allergic rhinoconjunctivitis Yes    Flu vaccine need     Need for COVID-19 vaccine      Skin testing today to common indoor and outdoor environmental allergies was positive to mold and negative to the remaining allergens    Positive histamine control    1.  Allergic rhinitis  See above clinical history  See above skin testing to screen for allergic triggers  Reviewed avoidance measures and potential treatment option of therapy  Consider trial of Xyzal, levocetirizine 5 mg once a day up to twice a day if needed in place of Zyrtec as an antihistamine  Continue with Flonase 2 sprays per nostril once a day  Patient deferred using Astelin due to the bitter taste  Singulair 10 mg once a day.  Reviewed FDA warning    2.  Flu vaccine up-to-date    3.  COVID-vaccine booster recommended.  Please check with local pharmacy as we do not stock the vaccine in office           Orders This Visit:  No orders of the defined types were placed in this encounter.      Meds This Visit:  Requested Prescriptions     Signed Prescriptions Disp Refills    levocetirizine 5 MG Oral Tab 180 tablet 1     Sig: Take 1 tablet (5 mg total) by mouth in the morning and 1 tablet (5 mg total) before bedtime.       Imaging & Referrals:  None     2/12/2025  Vinnie Galdamez MD      If medication samples were provided today, they were provided solely for patient education and training related to self administration of these medications.  Teaching, instruction and sample was provided to the patient by myself.  Teaching included  a review of potential adverse side effects as well as potential efficacy.  Patient's questions were answered in regards to medication  administration and dosing and potential side effects. Teaching was provided via the teach back method         [1] No Known Allergies

## 2025-02-12 NOTE — PATIENT INSTRUCTIONS
1.  Allergic rhinitis  See above clinical history  See above skin testing to screen for allergic triggers  Reviewed avoidance measures and potential treatment option of therapy  Consider trial of Xyzal, levocetirizine 5 mg once a day up to twice a day if needed in place of Zyrtec as an antihistamine  Continue with Flonase 2 sprays per nostril once a day  Patient deferred using Astelin due to the bitter taste  Singulair 10 mg once a day.  Reviewed FDA warning    2.  Flu vaccine up-to-date    3.  COVID-vaccine booster recommended.  Please check with local pharmacy as we do not stock the vaccine in office           Orders This Visit:  No orders of the defined types were placed in this encounter.      Meds This Visit:  Requested Prescriptions     Signed Prescriptions Disp Refills    levocetirizine 5 MG Oral Tab 180 tablet 1     Sig: Take 1 tablet (5 mg total) by mouth in the morning and 1 tablet (5 mg total) before bedtime.

## 2025-02-18 ENCOUNTER — OFFICE VISIT (OUTPATIENT)
Dept: OBGYN CLINIC | Facility: CLINIC | Age: 55
End: 2025-02-18
Payer: COMMERCIAL

## 2025-02-18 VITALS
BODY MASS INDEX: 33.34 KG/M2 | HEIGHT: 60 IN | SYSTOLIC BLOOD PRESSURE: 125 MMHG | DIASTOLIC BLOOD PRESSURE: 80 MMHG | WEIGHT: 169.81 LBS

## 2025-02-18 DIAGNOSIS — Z01.419 ENCOUNTER FOR GYNECOLOGICAL EXAMINATION WITHOUT ABNORMAL FINDING: Primary | ICD-10-CM

## 2025-02-18 PROCEDURE — 3008F BODY MASS INDEX DOCD: CPT | Performed by: OBSTETRICS & GYNECOLOGY

## 2025-02-18 PROCEDURE — 3079F DIAST BP 80-89 MM HG: CPT | Performed by: OBSTETRICS & GYNECOLOGY

## 2025-02-18 PROCEDURE — 3074F SYST BP LT 130 MM HG: CPT | Performed by: OBSTETRICS & GYNECOLOGY

## 2025-02-18 PROCEDURE — 99396 PREV VISIT EST AGE 40-64: CPT | Performed by: OBSTETRICS & GYNECOLOGY

## 2025-02-18 NOTE — PROGRESS NOTES
HPI:   Paty Ibrahim is a 54 year old female who presents for an annual exam.     Wt Readings from Last 6 Encounters:   02/18/25 169 lb 12.8 oz (77 kg)   01/28/25 166 lb (75.3 kg)   01/20/25 166 lb 12.8 oz (75.7 kg)   03/25/24 164 lb (74.4 kg)   02/29/24 164 lb 4 oz (74.5 kg)   02/13/24 163 lb (73.9 kg)     Body mass index is 33.16 kg/m².    Cholesterol, Total (mg/dL)   Date Value   01/20/2025 167   12/21/2023 171   12/05/2022 143     HDL Cholesterol (mg/dL)   Date Value   01/20/2025 62 (H)   12/21/2023 70 (H)   12/05/2022 56     LDL Cholesterol (mg/dL)   Date Value   01/20/2025 89   12/21/2023 87   12/05/2022 71     AST (U/L)   Date Value   01/20/2025 27   12/21/2023 27   12/05/2022 16     ALT (U/L)   Date Value   01/20/2025 21   12/21/2023 18   12/05/2022 22        Current Outpatient Medications   Medication Sig Dispense Refill    Rhubarb (ESTROVEN COMPLETE OR) Take by mouth.      VITAMIN E OR       levocetirizine 5 MG Oral Tab Take 1 tablet (5 mg total) by mouth in the morning and 1 tablet (5 mg total) before bedtime. 180 tablet 1    aspirin (ELIZA ASPIRIN EC LOW DOSE) 81 MG Oral Tab EC       Lovastatin 20 MG Oral Tab Take 1 tablet (20 mg total) by mouth daily with dinner. 90 tablet 3    hydroCHLOROthiazide 12.5 MG Oral Tab Take 1 tablet (12.5 mg total) by mouth daily. 90 tablet 3    Rizatriptan Benzoate 10 MG Oral Tab       clotrimazole 2 % Vaginal Cream Apply one applicatorful nightly 21 g 0    Probiotic Product (PROBIOTIC ACIDOPHILUS) Oral Chew Tab Chew by mouth.      Cholecalciferol (VITAMIN D) 50 MCG (2000 UT) Oral Cap Take by mouth.      Multiple Vitamins-Minerals (WOMENS MULTIVITAMIN PLUS) Oral Tab Take by mouth.      montelukast 10 MG Oral Tab Take 1 tablet (10 mg total) by mouth nightly. (Patient not taking: Reported on 2/18/2025) 30 tablet 3    azelastine 0.1 % Nasal Solution 1 spray by Nasal route 2 (two) times daily. (Patient not taking: Reported on 2/18/2025) 30 mL 3    fluticasone propionate 50  MCG/ACT Nasal Suspension 1 spray by Nasal route 2 (two) times daily. (Patient not taking: Reported on 2025) 48 mL 3      Past Medical History:    Dehydration    Depression    High cholesterol    History of total hysterectomy    Migraines    Missed  (HCC)    Other and unspecified hyperlipidemia    Other ill-defined conditions(799.89)    \"ACS-US\". \"Endometrial curettings/Cervical biopsy\"    Pregnancy (HCC)    Pregnancy (HCC)    Pregnancy (HCC)    Preoperative testing    Primary hypertension    Visual impairment      Past Surgical History:   Procedure Laterality Date    Colonoscopy      Hysterectomy  2020    Induced  17-24 weeks            Other surgical history      root cancel, with infection and crown replacement       Family History   Problem Relation Age of Onset    Cancer Father         Throat. Cause of death.     Psychiatric Father         alcoholic    Heart Disease Maternal Grandmother 85        Cause of death    Psychiatric Cousin         bipolar      Social History:   Social History     Socioeconomic History    Marital status: Single   Tobacco Use    Smoking status: Never     Passive exposure: Never    Smokeless tobacco: Never   Vaping Use    Vaping status: Never Used   Substance and Sexual Activity    Alcohol use: Not Currently     Alcohol/week: 0.0 standard drinks of alcohol     Comment: Socially, 1 drink a year.    Drug use: No    Sexual activity: Never   Other Topics Concern    Caffeine Concern Yes     Comment: Soda, coffee - 1 cup per day             REVIEW OF SYSTEMS:   GENERAL: feels well otherwise  SKIN: denies any unusual skin lesions  EYES:denies blurred vision or double vision  HEENT: denies nasal congestion, sinus pain or ST  LUNGS: denies shortness of breath with exertion  CARDIOVASCULAR: denies chest pain on exertion  GI: denies abdominal pain,denies heartburn  : denies dysuria, vaginal discharge or itching,periods regular   MUSCULOSKELETAL: denies  back pain  NEURO: denies headaches  PSYCHE: denies depression or anxiety  HEMATOLOGIC: denies hx of anemia  ENDOCRINE: denies thyroid history  ALL/ASTHMA: denies hx of allergy or asthma    EXAM:   /80   Ht 5' (1.524 m)   Wt 169 lb 12.8 oz (77 kg)   LMP 05/04/2020 (LMP Unknown)   BMI 33.16 kg/m²   Body mass index is 33.16 kg/m².   GENERAL: well developed, well nourished,in no apparent distress  SKIN: no rashes,no suspicious lesions  HEENT: atraumatic, normocephalic  EYES:normal in appearance  NECK: supple,no adenopathy  CHEST: no chest tenderness  BREAST: no dominant or suspicious mass  LUNGS: clear to auscultation  CARDIO: RRR without murmur  GI: good BS's,no masses, HSM or tenderness  :introitus is normal,scant discharge,cuff is pink,no adnexal masses or tenderness    MUSCULOSKELETAL: back is not tender,FROM of the back  EXTREMITIES: no cyanosis, clubbing or edema  NEURO: Oriented times three      ASSESSMENT AND PLAN:   Paty Ibrahim is a 54 year old female who presents for an annual exam.  . Self breast exam explained. Health maintenance. Body mass index is 33.16 kg/m²., recommended low fat diet and aerobic exercise 30 minutes three times weekly.  The patient indicates understanding of these issues and agrees to the plan.  The patient is asked to return for an annual visit.

## 2025-03-01 ENCOUNTER — PATIENT MESSAGE (OUTPATIENT)
Dept: ALLERGY | Facility: CLINIC | Age: 55
End: 2025-03-01

## 2025-03-03 ENCOUNTER — TELEMEDICINE (OUTPATIENT)
Dept: ALLERGY | Facility: CLINIC | Age: 55
End: 2025-03-03
Payer: COMMERCIAL

## 2025-03-03 DIAGNOSIS — H10.10 SEASONAL AND PERENNIAL ALLERGIC RHINOCONJUNCTIVITIS: Primary | ICD-10-CM

## 2025-03-03 DIAGNOSIS — J30.2 SEASONAL AND PERENNIAL ALLERGIC RHINOCONJUNCTIVITIS: Primary | ICD-10-CM

## 2025-03-03 DIAGNOSIS — Z23 NEED FOR COVID-19 VACCINE: ICD-10-CM

## 2025-03-03 DIAGNOSIS — J01.20 ACUTE ETHMOIDAL SINUSITIS, RECURRENCE NOT SPECIFIED: ICD-10-CM

## 2025-03-03 DIAGNOSIS — J30.89 SEASONAL AND PERENNIAL ALLERGIC RHINOCONJUNCTIVITIS: Primary | ICD-10-CM

## 2025-03-03 DIAGNOSIS — Z23 FLU VACCINE NEED: ICD-10-CM

## 2025-03-03 RX ORDER — DOXYCYCLINE 100 MG/1
100 CAPSULE ORAL 2 TIMES DAILY
Qty: 20 CAPSULE | Refills: 0 | Status: SHIPPED | OUTPATIENT
Start: 2025-03-03 | End: 2025-03-13

## 2025-03-03 RX ORDER — METHYLPREDNISOLONE 4 MG/1
TABLET ORAL
Qty: 21 TABLET | Refills: 0 | Status: SHIPPED | OUTPATIENT
Start: 2025-03-03

## 2025-03-03 NOTE — TELEPHONE ENCOUNTER
Patient returned call.  Patient given Dr. Galdamez's advice as below.    Patient offers that she is taking Xyzal 5 mg once a day, Flonase 1 spray per nostril 2 times a day, Astelin Nasal Spray 1 spray per nostril daily.  She denies that she has used sinus rinses.  She states that she feels she has a sinus infection and asked to be seen today.      Patient given Dr. Galdamez's advice as below.     Patient asked to present to Urgent Care for evaluation or call Primary Care Physician to inquire if an appointment is available today.  Patient offers last time she went to Urgent Care with last sinus infection 1/2025, Urgent Care diagnosed her with a viral infection and would not give her medication.  Primary Care Physician in 1/2025 gave prescription for sinus infection.     Patient states she does not think her Primary Care will be able to see her today.     She was offered an appointment 3/4/2025 to see Dr. Galdamez at 11:45 for virtual visit.     Patient declined stating she needs to go back to work 3/4/2025.     Dr. Galdamez gave permission to schedule patient for 11:45 video visit today.  Patient scheduled appointment.     Vinnie Galdamez MD Physician Signed 8:51 AM     Copy       Call noted.  Please make sure patient is using Xyzal Singulair and Flonase as previously recommended.  May add nasal saline sinus rinses.  If not improving then may consider follow-up appointment it is not my policy to prescribe antibiotics or steroids over the phone  Otherwise if Dr. Palacio was the previous prescribing physician for antibiotic she may reach out to him as well        Per Dr. Galdamez's 2/12/2025 Office Visit Note, patient should be taking Flonase 2 sprays per nostril one time a day, Xyzal 5 mg 1-2 times a day as symptoms dictate and Singulair 10 mg one time a day.

## 2025-03-03 NOTE — TELEPHONE ENCOUNTER
Patient alert and oriented x 4, ambulatory with assistance of walker, gait is steady. Patient has no difficulties with ROM to bilateral upper and lower extremities. Lower back jennifer drain with 120 cc seroussangious drainage, dressing c/d/I. Pain is being managed with medication. Patient tolerating ambulation well without difficulty. Patient met DTV with 500cc ml clear yellow urine s/p dominguez discontinuation.   Patient calling back - transferred from phone room  Verified patient's name and birthday   Per patient has been sick since last Tuesday 2/2525  Symptoms include sinus pressure, runny nose with discolored drainage, and wet cough  Patient has been trouble sleeping at night due to clogged nasal passages making it hard to breathe through her nose   Patient can be heard coughing on the phone but speaking in clear and complete sentences   Patient denies any chest tightness or shortness of breath other than difficulty breathing through nose  No fever - patient states it is hard to tell whether she has a fever at times due to she experiences hot flashes due to menopause  No body aches or sore throat   Denies taking at home flu/COVID tests    Patient had a recent sinus/viral infection last month and finished taking a 10 day supply of amoxicillin prescribed by Dr. Palacio about 2 weeks ago   Feels like symptoms are coming back    Is currently taking xyzal 5 mg once daily, singulair 10 mg once daily, flonase and astelin and recently started taking Muccinex DM - patient started that on Friday 2/28/25     RN advised that message will be sent to Dr. Galdamez for further advice/recommendations   Will give patient a call back later today with advice   Per patient asked if staff can call her back using her home phone number at [phone]

## 2025-03-03 NOTE — TELEPHONE ENCOUNTER
Left message to call back on patient's voicemail.     Please give patient Dr. Galdamez's advice as below when she returns call.    Vinnie Galdamez MD Physician Signed 8:51 AM     Copy       Call noted.  Please make sure patient is using Xyzal Singulair and Flonase as previously recommended.  May add nasal saline sinus rinses.  If not improving then may consider follow-up appointment it is not my policy to prescribe antibiotics or steroids over the phone  Otherwise if Dr. Palacio was the previous prescribing physician for antibiotic she may reach out to him as well        Per Dr. Galdamez's 2/12/2025 Office Visit Note, patient should be taking Flonase 2 sprays per nostril one time a day, Xyzal 5 mg 1-2 times a day as symptoms dictate and Singulair 10 mg one time a day.

## 2025-03-03 NOTE — TELEPHONE ENCOUNTER
Left a message for patient to please contact our office to speak with a nurse regarding her mychart message about sinus symptoms.

## 2025-03-03 NOTE — PATIENT INSTRUCTIONS
#1 allergic rhinitis  Continue with current medications.  Reviewed avoidance measures and potential treatment option immunotherapy  Currently using Xyzal Singulair Astelin Flonase.  Also adding Mucinex D given current sinus symptoms.    2.  Flu vaccine up-to-date from the fall    3.  COVID-vaccine booster recommended.  Please check with local pharmacy as we do not stock the vaccine in office.  Most recent booster is in September 2024    #4 acute sinusitis  Start doxycycline twice a day x 10 days  Add Medrol Dosepak and 3 days of not improving with doxycycline  Continue with Mucinex D underlying congestion.  Continue with allergy medications.  Consider sinus rinses.  Patient to keep me posted if not improving as we may consider CT of sinuses if not improving.  Assessment & Plan              Orders This Visit:  No orders of the defined types were placed in this encounter.      Meds This Visit:  Requested Prescriptions     Signed Prescriptions Disp Refills    doxycycline 100 MG Oral Cap 20 capsule 0     Sig: Take 1 capsule (100 mg total) by mouth 2 (two) times daily for 10 days.    methylPREDNISolone 4 MG Oral Tablet Therapy Pack 21 tablet 0     Sig: Take x 6 days with foods by mouth

## 2025-03-03 NOTE — TELEPHONE ENCOUNTER
Call noted.  Please make sure patient is using Xyzal Singulair and Flonase as previously recommended.  May add nasal saline sinus rinses.  If not improving then may consider follow-up appointment it is not my policy to prescribe antibiotics or steroids over the phone  Otherwise if Dr. Palacio was the previous prescribing physician for antibiotic she may reach out to him as well

## 2025-03-03 NOTE — PROGRESS NOTES
Paty Ibrahim is a 54 year old female.    HPI:   No chief complaint on file.    Patient is a 54-year-old female who presents for follow-up via video visit    This visit is conducted using Telemedicine with live, interactive video and audio during this Coronavirus pandemic.     Please note that the following visit was completed using two-way, real-time interactive audio and/or video communication.  This has been done in good mansoor to provide continuity of care in the best interest of the provider-patient relationship, due to the ongoing public health crisis/national emergency and because of restrictions of visitation.  There are limitations of this visit as no physical exam could be performed.  Every conscious effort was taken to allow for sufficient and adequate time.  This billing was spent on reviewing labs, medications, radiology tests and decision making.  Appropriate medical decision-making and tests are ordered as detailed in the plan of care below   patient last seen by me on February 12, 2025.  History of allergic rhinitis.  Prior skin testing positive mold.  Previous symptoms include nasal congestion sinus congestion postnasal drip  Previous ENT evaluation in the past with Dr. Palacio  No prior sinus imaging.  Previous antibiotics including Augmentin from ENT on January 28, 2025  Medication list include Xyzal Singulair Astelin Flonase.  Not using Astelin due to side effect from the bitter taste.    Immunizations reviewed.  COVID-vaccine x 3 doses last in 2021  Flu vaccine up-to-date from November 2024.        Today patient reports    Ar:/sinu issues   Active or persistent symptoms: 6 days with symptoms  + ethmoid sinus congestion   Added mucinex-d and tyelnol/motrin prn , warm compress  + yellow nasal drainage  No fever   See med list   pets : none   History of Present Illness           HISTORY:  Past Medical History:    Dehydration    Depression    High cholesterol    History of total hysterectomy     Migraines    Missed  (HCC)    Other and unspecified hyperlipidemia    Other ill-defined conditions(799.89)    \"ACS-US\". \"Endometrial curettings/Cervical biopsy\"    Pregnancy (HCC)    Pregnancy (HCC)    Pregnancy (HCC)    Preoperative testing    Primary hypertension    Visual impairment      Past Surgical History:   Procedure Laterality Date    Colonoscopy      Hysterectomy  2020    Induced  17-24 weeks            Other surgical history      root cancel, with infection and crown replacement       Family History   Problem Relation Age of Onset    Cancer Father         Throat. Cause of death.     Psychiatric Father         alcoholic    Heart Disease Maternal Grandmother 85        Cause of death    Psychiatric Cousin         bipolar      Social History:   Social History     Socioeconomic History    Marital status: Single   Tobacco Use    Smoking status: Never     Passive exposure: Never    Smokeless tobacco: Never   Vaping Use    Vaping status: Never Used   Substance and Sexual Activity    Alcohol use: Not Currently     Alcohol/week: 0.0 standard drinks of alcohol     Comment: Socially, 1 drink a year.    Drug use: No    Sexual activity: Never   Other Topics Concern    Caffeine Concern Yes     Comment: Soda, coffee - 1 cup per day         Medications (Active prior to today's visit):  Current Outpatient Medications   Medication Sig Dispense Refill    doxycycline 100 MG Oral Cap Take 1 capsule (100 mg total) by mouth 2 (two) times daily for 10 days. 20 capsule 0    methylPREDNISolone 4 MG Oral Tablet Therapy Pack Take x 6 days with foods by mouth 21 tablet 0    Rhubarb (ESTROVEN COMPLETE OR) Take by mouth.      VITAMIN E OR       levocetirizine 5 MG Oral Tab Take 1 tablet (5 mg total) by mouth in the morning and 1 tablet (5 mg total) before bedtime. 180 tablet 1    montelukast 10 MG Oral Tab Take 1 tablet (10 mg total) by mouth nightly. (Patient not taking: Reported on 2025) 30  tablet 3    azelastine 0.1 % Nasal Solution 1 spray by Nasal route 2 (two) times daily. (Patient not taking: Reported on 2/18/2025) 30 mL 3    aspirin (ELIZA ASPIRIN EC LOW DOSE) 81 MG Oral Tab EC       Lovastatin 20 MG Oral Tab Take 1 tablet (20 mg total) by mouth daily with dinner. 90 tablet 3    hydroCHLOROthiazide 12.5 MG Oral Tab Take 1 tablet (12.5 mg total) by mouth daily. 90 tablet 3    fluticasone propionate 50 MCG/ACT Nasal Suspension 1 spray by Nasal route 2 (two) times daily. (Patient not taking: Reported on 2/18/2025) 48 mL 3    Rizatriptan Benzoate 10 MG Oral Tab       clotrimazole 2 % Vaginal Cream Apply one applicatorful nightly 21 g 0    Probiotic Product (PROBIOTIC ACIDOPHILUS) Oral Chew Tab Chew by mouth.      Cholecalciferol (VITAMIN D) 50 MCG (2000 UT) Oral Cap Take by mouth.      Multiple Vitamins-Minerals (WOMENS MULTIVITAMIN PLUS) Oral Tab Take by mouth.         Allergies:  Allergies[1]      ROS:   Allergic/Immuno:  See hpi  Cardiovascular:  Negative for irregular heartbeat/palpitations, chest pain, edema  Constitutional:  Negative night sweats,weight loss, irritability and lethargy  ENMT:  Negative for ear drainage, hearing loss see hpi   Eyes:  Negative for eye discharge and vision loss  Gastrointestinal:  Negative for abdominal pain, diarrhea and vomiting  Integumentary:  Negative for pruritus and rash  Respiratory:  Negative for cough, dyspnea and wheezing    PHYSICAL EXAM:   Constitutional: responsive, no acute distress noted  Head/Face: NC/Atraumatic  Speaking in full sentences no increased work of breathing  A&O x 3   ASSESSMENT/PLAN:   Assessment   Encounter Diagnoses   Name Primary?    Seasonal and perennial allergic rhinoconjunctivitis Yes    Flu vaccine need     Need for COVID-19 vaccine     Acute ethmoidal sinusitis, recurrence not specified          #1 allergic rhinitis  Continue with current medications.  Reviewed avoidance measures and potential treatment option  immunotherapy  Currently using Xyzal Singulair Astelin Flonase.  Also adding Mucinex D given current sinus symptoms.    2.  Flu vaccine up-to-date from the fall    3.  COVID-vaccine booster recommended.  Please check with local pharmacy as we do not stock the vaccine in office.  Most recent booster is in September 2024    #4 acute sinusitis  Start doxycycline twice a day x 10 days  Add Medrol Dosepak and 3 days of not improving with doxycycline  Continue with Mucinex D underlying congestion.  Continue with allergy medications.  Consider sinus rinses.  Patient to keep me posted if not improving as we may consider CT of sinuses if not improving.  Assessment & Plan              Orders This Visit:  No orders of the defined types were placed in this encounter.      Meds This Visit:  Requested Prescriptions     Signed Prescriptions Disp Refills    doxycycline 100 MG Oral Cap 20 capsule 0     Sig: Take 1 capsule (100 mg total) by mouth 2 (two) times daily for 10 days.    methylPREDNISolone 4 MG Oral Tablet Therapy Pack 21 tablet 0     Sig: Take x 6 days with foods by mouth       Imaging & Referrals:  None     3/3/2025  Vinnie Galdamez MD    If medication samples were provided today, they were provided solely for patient education and training related to self administration of these medications.  Teaching, instruction and sample was provided to the patient by myself.  Teaching included  a review of potential adverse side effects as well as potential efficacy.  Patient's questions were answered in regards to medication administration and dosing and potential side effects. Teaching was provided via the teach back method         [1] No Known Allergies

## 2025-04-25 RX ORDER — MONTELUKAST SODIUM 10 MG/1
10 TABLET ORAL NIGHTLY
Qty: 90 TABLET | Refills: 0 | Status: SHIPPED | OUTPATIENT
Start: 2025-04-25

## 2025-07-05 ENCOUNTER — HOSPITAL ENCOUNTER (OUTPATIENT)
Dept: MAMMOGRAPHY | Age: 55
Discharge: HOME | End: 2025-07-05
Attending: NURSE PRACTITIONER
Payer: COMMERCIAL

## 2025-07-05 DIAGNOSIS — Z12.31 ENCOUNTER FOR SCREENING MAMMOGRAM FOR BREAST CANCER: ICD-10-CM

## 2025-07-05 PROCEDURE — 77067 SCR MAMMO BI INCL CAD: CPT | Performed by: RADIOLOGY

## 2025-07-05 PROCEDURE — 77063 BREAST TOMOSYNTHESIS BI: CPT | Performed by: RADIOLOGY

## 2025-08-01 RX ORDER — MONTELUKAST SODIUM 10 MG/1
10 TABLET ORAL NIGHTLY
Qty: 90 TABLET | Refills: 0 | Status: SHIPPED | OUTPATIENT
Start: 2025-08-01

## 2025-08-19 ENCOUNTER — OFFICE VISIT (OUTPATIENT)
Dept: INTERNAL MEDICINE CLINIC | Facility: CLINIC | Age: 55
End: 2025-08-19

## 2025-08-19 ENCOUNTER — PATIENT MESSAGE (OUTPATIENT)
Dept: ALLERGY | Facility: CLINIC | Age: 55
End: 2025-08-19

## 2025-08-19 VITALS
HEIGHT: 60 IN | SYSTOLIC BLOOD PRESSURE: 121 MMHG | OXYGEN SATURATION: 98 % | BODY MASS INDEX: 32.59 KG/M2 | TEMPERATURE: 97 F | DIASTOLIC BLOOD PRESSURE: 65 MMHG | WEIGHT: 166 LBS | HEART RATE: 53 BPM

## 2025-08-19 DIAGNOSIS — R20.2 PARESTHESIA OF LEFT ARM: Primary | ICD-10-CM

## 2025-08-19 DIAGNOSIS — J30.1 ALLERGIC RHINITIS DUE TO POLLEN, UNSPECIFIED SEASONALITY: ICD-10-CM

## 2025-08-19 PROCEDURE — 3078F DIAST BP <80 MM HG: CPT | Performed by: NURSE PRACTITIONER

## 2025-08-19 PROCEDURE — 99214 OFFICE O/P EST MOD 30 MIN: CPT | Performed by: NURSE PRACTITIONER

## 2025-08-19 PROCEDURE — 3008F BODY MASS INDEX DOCD: CPT | Performed by: NURSE PRACTITIONER

## 2025-08-19 PROCEDURE — 3074F SYST BP LT 130 MM HG: CPT | Performed by: NURSE PRACTITIONER

## 2025-08-22 ENCOUNTER — OFFICE VISIT (OUTPATIENT)
Facility: LOCATION | Age: 55
End: 2025-08-22

## 2025-08-22 DIAGNOSIS — J30.9 ALLERGIC RHINITIS, UNSPECIFIED SEASONALITY, UNSPECIFIED TRIGGER: ICD-10-CM

## 2025-08-22 DIAGNOSIS — H10.13 ALLERGIC CONJUNCTIVITIS OF BOTH EYES: ICD-10-CM

## 2025-08-22 DIAGNOSIS — H02.32 BLEPHAROCHALASIS OF BOTH LOWER EYELIDS: Primary | ICD-10-CM

## 2025-08-22 DIAGNOSIS — H02.35 BLEPHAROCHALASIS OF BOTH LOWER EYELIDS: Primary | ICD-10-CM

## 2025-08-22 PROCEDURE — 99213 OFFICE O/P EST LOW 20 MIN: CPT | Performed by: STUDENT IN AN ORGANIZED HEALTH CARE EDUCATION/TRAINING PROGRAM

## (undated) DEVICE — MONOPOLAR CURVED SCISSORS: Brand: ENDOWRIST

## (undated) DEVICE — ENCORE® LATEX ACCLAIM SIZE 7, STERILE LATEX POWDER-FREE SURGICAL GLOVE: Brand: ENCORE

## (undated) DEVICE — SUTURE VLOC 180 0 9\" 0346

## (undated) DEVICE — GOWN SURG AERO BLUE PERF LG

## (undated) DEVICE — STERILE SURGICAL LUBRICANT, METAL TUBE: Brand: SURGILUBE

## (undated) DEVICE — COVER SGL STRL LGHT HNDL BLU

## (undated) DEVICE — UNDYED BRAIDED (POLYGLACTIN 910), SYNTHETIC ABSORBABLE SUTURE: Brand: COATED VICRYL

## (undated) DEVICE — ARM DRAPE

## (undated) DEVICE — ROBOTIC: Brand: MEDLINE INDUSTRIES, INC.

## (undated) DEVICE — SUCTION CANISTER, 3000CC,SAFELINER: Brand: DEROYAL

## (undated) DEVICE — VCARE MEDIUM, UTERINE MANIPULATOR, VAGINAL-CERVICAL-AHLUWALIA'S-RETRACTOR-ELEVATOR: Brand: VCARE

## (undated) DEVICE — TIP COVER ACCESSORY

## (undated) DEVICE — VESSEL SEALER EXTEND: Brand: ENDOWRIST

## (undated) DEVICE — TRAY SKIN PREP PVP-1

## (undated) DEVICE — ENCORE® LATEX MICRO SIZE 7.5, STERILE LATEX POWDER-FREE SURGICAL GLOVE: Brand: ENCORE

## (undated) DEVICE — TRI-LUMEN FILTERED TUBE SET WITH ACTIVATED CHARCOAL FILTER: Brand: AIRSEAL

## (undated) DEVICE — MEGA NEEDLE DRIVER: Brand: ENDOWRIST

## (undated) DEVICE — C-ARM: Brand: UNBRANDED

## (undated) DEVICE — SOL  .9 3000ML

## (undated) DEVICE — CANNULA SEAL

## (undated) DEVICE — LAPAROVUE VISIBILITY SYSTEM LAPAROSCOPIC SOLUTIONS: Brand: LAPAROVUE

## (undated) DEVICE — INSUFFLATION NEEDLE TO ESTABLISH PNEUMOPERITONEUM.: Brand: INSUFFLATION NEEDLE

## (undated) DEVICE — DRAPE SHEET LAVH 124X112X30

## (undated) DEVICE — 40580 - THE PINK PAD - ADVANCED TRENDELENBURG POSITIONING KIT: Brand: 40580 - THE PINK PAD - ADVANCED TRENDELENBURG POSITIONING KIT

## (undated) DEVICE — AIRSEAL 8 MM ACCESS PORT AND LOW PROFILE OBTURATOR WITH BLADELESS OPTICAL TIP, 120 MM LENGTH: Brand: AIRSEAL

## (undated) DEVICE — COLUMN DRAPE

## (undated) DEVICE — SOL  .9 1000ML BTL

## (undated) NOTE — ED AVS SNAPSHOT
Chinmay Factor   MRN: [id number]    Department:  [facility] Emergency Department   Date of Visit:  9/2/2019           Disclosure     Insurance plans vary and the physician(s) referred by the ER may not be covered by your plan.  Please contact y CARE PHYSICIAN AT ONCE OR RETURN IMMEDIATELY TO THE EMERGENCY DEPARTMENT. If you have been prescribed any medication(s), please fill your prescription right away and begin taking the medication(s) as directed.   If you believe that any of the medications

## (undated) NOTE — LETTER
3/2/2019              Patyscott Mccallt        [address]         Dear Juli Bernabe,    It was a pleasure to see you. Your pap was normal.  There is no need for further testing at this time.   I look forward to seeing you at your next

## (undated) NOTE — LETTER
AUTHORIZATION FOR SURGICAL OPERATION OR OTHER PROCEDURE    1. I hereby authorize Dr. Gary Jolly and [facility] staff assigned to my case to perform the following operation and/or procedure at the [facility]:  [place]  ____    2.   My physician h Relationship to Patient:           []  Parent    Responsible person                          []  Spouse  In case of minor or                    [] Other  _____________   Incompetent name:  __________________________________________________

## (undated) NOTE — LETTER
12/22/2020              Paty Ibrahim        [address] [place] [id number]       To Whom It May Concern,    The above named patient is currently under my medical care.  She may begin her leave from work on 12/7/2020 due to presurgical testin

## (undated) NOTE — MR AVS SNAPSHOT
[address] [address]  [phone]               Thank you for choosing us for your health care visit with Lyndon Conde MD.  We are glad to serve you and happy to provide you with this summary of your Kegel exercises can help you strengthen your pelvic floor muscles. Then they can better support the pelvic organs and control urine flow. How Kegel exercises are done  Try each of the Kegel exercises described below.  When you’re doing them, try not to mov Allergies as of Feb 17, 2017     No Known Allergies                Today's Vital Signs     BP Pulse Temp Height Weight BMI    110/72 mmHg 73 98.2 °F (36.8 °C) (Oral) 5' (1.524 m) 144 lb (65.318 kg) 28.12 kg/m2         Current Medications          This list

## (undated) NOTE — LETTER
5/16/2018          To Whom It May Concern:    Ashtyn Alvarado is currently under my medical care and may not return to work at this time. Please excuse Paty for 1 days. She may return to work on 5/17/2018.       If you require additional information

## (undated) NOTE — MR AVS SNAPSHOT
After Visit Summary   2/29/2024    Paty Ibrahim   MRN: [id number]           Visit Information     Date & Time  2/29/2024  1:40 PM Provider  Krystian Vera MD [facility] - OB/GYN Dept. Phone  [phone]      Your Vitals Were  Most recent update: 2/29/2024  2:39 PM    BP   134/84 (BP Location: Left arm, Patient Position: Sitting, Cuff Size: large)          Pulse   56          Ht   60\"          Wt   164 lb 4 oz          LMP   05/04/2020 (LMP Unknown)             BMI   32.08 kg/m²         Allergies as of 2/29/2024  Review status set to Review Complete on 2/29/2024   No Known Allergies     Your Current Medications        Dosage    Rizatriptan Benzoate 10 MG Oral Tab     Norethindrone 0.35 MG Oral Tab Take 1 tablet (0.35 mg total) by mouth daily.    Mirabegron ER 50 MG Oral Tablet 24 Hr Take 1 tablet (50 mg total) by mouth daily.    Lovastatin 20 MG Oral Tab TAKE 1 TABLET BY MOUTH EVERY DAY WITH THE EVENING MEAL    hydroCHLOROthiazide 12.5 MG Oral Tab Take 1 tablet (12.5 mg total) by mouth daily.    fluticasone propionate 50 MCG/ACT Nasal Suspension 1 spray by Nasal route 2 (two) times daily.    Probiotic Product (PROBIOTIC ACIDOPHILUS) Oral Chew Tab Chew by mouth.    Cholecalciferol (VITAMIN D) 50 MCG (2000 UT) Oral Cap Take by mouth.    Multiple Vitamins-Minerals (WOMENS MULTIVITAMIN PLUS) Oral Tab Take by mouth.    clotrimazole 2 % Vaginal Cream Apply one applicatorful nightly      Diagnoses for This Visit    Encounter for Papanicolaou smear for cervical cancer screening   [[id number]]  -  Primary  Breast cancer screening by mammogram   [[id number]]             We Ordered the Following     Normal Orders This Visit    Hpv Dna  High Risk , Thin Prep Collect [[id number] CUSTOM]     THINPREP PAP SMEAR ONLY [[id number] CUSTOM]     ThinPrep PAP Smear [[id number] CUSTOM]     Future Labs/Procedures Expected by Expires    [facility] HAKAN 2D+3D SCREENING BILAT  (CPT=77067/14684) [COMBO CPT(R)]  2/29/2024 (Approximate) 2/28/2025      Future Appointments        Provider Department    4/15/2024 3:45 PM Abel Lanza [facility]      Imaging Scheduling Instructions     Around February 29, 2024   Imaging:   RAYA HKAAN 2D+3D SCREENING BILAT (CPT=77067/86742)    Instructions: To schedule a test at any [facility], call Central Scheduling at [phone], Monday through Friday between 7:30am to 6pm and on Saturday between 8am and 1pm.      [facility] ([facility])        [address]    [place], [place]          It is the patient's responsibility to check with and follow their insurance company's guidelines for prior authorization for this test.  You may be held responsible for payment in full if proper authorization is not acquired.  Please contact the Patient Business Office at [phone] if you have   any questions related to insurance coverage.  Thank you.    Children: Children under the age of 12 must have another adult caregiver with them. Please do not bring your child/children without a caregiver. Because of the highly sensitive equipment and privacy of all our patients, children will not be permitted in the exam rooms, unless otherwise noted and in   accordance with departmental policy.                  Did you know that [facility] primary care physicians now offer Video Visits through [facility] for adult patients for a variety of conditions such as allergies, back pain and cold symptoms? Skip the drive and waiting room and online chat with a doctor face-to-face using your web-cam enabled computer or mobile device wherever you are. Video Visits cost $50 and can be paid hassle-free using a credit, debit, or health savings card.  Not active on [facility]? Ask us how to get signed up today!          If you receive a survey from [employer], please take a few minutes to complete it  and provide feedback. We strive to deliver the best patient experience and are looking for ways to make improvements. Your feedback will help us do so. For more information on [employer], please visit [email or web address]           No text in SmartText           No text in SmartText

## (undated) NOTE — LETTER
1/28/2025              Paty Ibrahim        [address], [address]        [place] [address]         To Whom it may concern:    This is to certify that Paty Ibrahim had an appointment on 1/28/2025 at 8:36 AM with Fernie Palacio DO.        Sincerely,    Fernie Palacio DO  [facility], [address] [address]  [phone]

## (undated) NOTE — MR AVS SNAPSHOT
[facility]  [address] [place] [address] [phone]               Thank you for choosing us for your health care visit with Titi Saldana MD.  We are glad to serve you and happy to provide you with this summary of your vis Today's Orders     ThinPrep PAP with HPV Reflex Request [E]    Complete by:  Jan 16, 2017    Assoc Dx:  Visit for screening mammogram [Z12.31]           Mammo Screening BILATERAL    Complete by:  Jan 16, 2017 (Approximate)    Assoc Dx:   Well woman exam [Z0 MyChart questions? Call [phone] for help. [employer] is NOT to be used for urgent needs. For medical emergencies, dial 911.         Educational Information     Healthy Diet and Regular Exercise  [employer] [address] [place] [address] [employer]

## (undated) NOTE — LETTER
Date & Time: 1/26/2025, 8:59 AM  Patient: Paty Ibrahim  Encounter Provider(s):    Lucia Thompson DO       To Whom It May Concern:    Paty Ibrahim was seen and treated in our department on 1/26/2025. She cannot return to work until 1/29/2025 as well as fever free for 24 hours without the use of antifever medication and beginning to improve.      _____Lucia Thompson DO________________________  Physician/APC Signature